# Patient Record
Sex: MALE | Race: WHITE | Employment: FULL TIME | URBAN - METROPOLITAN AREA
[De-identification: names, ages, dates, MRNs, and addresses within clinical notes are randomized per-mention and may not be internally consistent; named-entity substitution may affect disease eponyms.]

---

## 2017-07-26 ENCOUNTER — OFFICE VISIT (OUTPATIENT)
Dept: INTERNAL MEDICINE CLINIC | Age: 37
End: 2017-07-26

## 2017-07-26 VITALS
OXYGEN SATURATION: 98 % | WEIGHT: 196 LBS | BODY MASS INDEX: 28.06 KG/M2 | HEIGHT: 70 IN | SYSTOLIC BLOOD PRESSURE: 130 MMHG | TEMPERATURE: 98.2 F | RESPIRATION RATE: 18 BRPM | DIASTOLIC BLOOD PRESSURE: 70 MMHG | HEART RATE: 77 BPM

## 2017-07-26 DIAGNOSIS — Z80.0 FHX: COLON CANCER: ICD-10-CM

## 2017-07-26 DIAGNOSIS — Z00.00 WELL ADULT EXAM: Primary | ICD-10-CM

## 2017-07-26 DIAGNOSIS — G47.9 SLEEP DISTURBANCE: ICD-10-CM

## 2017-07-26 NOTE — PROGRESS NOTES
Chief Complaint   Patient presents with   [facility] Establish Care     1. Have you been to the ER, urgent care clinic since your last visit? No Hospitalized since your last visit? No    2. Have you seen or consulted any other health care providers outside of the [address] [place] [address] since your last visit? Yes, Chiropractor  Include any pap smears or colon screening.  No

## 2017-07-26 NOTE — PATIENT INSTRUCTIONS
Follow a Mediterranean style diet. Stay physically active. Have blood work assessment done. Have colonoscopy done at age 46. Learning About the [address] Diet  What is the Mediterranean diet? The Mediterranean diet is a style of eating rather than a diet plan. It features foods eaten in [place], [place], [place], and other countries along the [facility]. It emphasizes eating foods like fish, fruits, vegetables, beans, high-fiber breads and whole grains, nuts, and olive oil. This style of eating includes limited red meat, cheese, and sweets. Why choose the Mediterranean diet? A Mediterranean-style diet may improve heart health. It contains more fat than other heart-healthy diets. But the fats are mainly from nuts, unsaturated oils (such as fish oils and olive oil), and certain nut or seed oils (such as canola, soybean, or flaxseed oil). These fats may help protect the heart and blood vessels. How can you get started on the Mediterranean diet? Here are some things you can do to switch to a more Mediterranean way of eating. What to eat  · Eat a variety of fruits and vegetables each day, such as grapes, blueberries, tomatoes, broccoli, peppers, figs, olives, spinach, eggplant, beans, lentils, and chickpeas. · Eat a variety of whole-grain foods each day, such as oats, brown rice, and whole wheat bread, pasta, and couscous. · Eat fish at least 2 times a week. Try tuna, salmon, mackerel, lake trout, herring, or sardines. · Eat moderate amounts of low-fat dairy products, such as milk, cheese, or yogurt. · Eat moderate amounts of poultry and eggs. · Choose healthy (unsaturated) fats, such as nuts, olive oil, and certain nut or seed oils like canola, soybean, and flaxseed. · Limit unhealthy (saturated) fats, such as butter, palm oil, and coconut oil. And limit fats found in animal products, such as meat and dairy products made with whole milk.  Try to eat red meat only a few times a month in very small amounts. · Limit sweets and desserts to only a few times a week. This includes sugar-sweetened drinks like soda. The Mediterranean diet may also include red wine with your meal--1 glass each day for women and up to 2 glasses a day for men. Tips for eating at home  · Use herbs, spices, garlic, lemon zest, and citrus juice instead of salt to add flavor to foods. · Add avocado slices to your sandwich instead of avelar. · Have fish for lunch or dinner instead of red meat. Brush the fish with olive oil, and broil or grill it. · Sprinkle your salad with seeds or nuts instead of cheese. · Cook with olive or canola oil instead of butter or oils that are high in saturated fat. · Switch from 2% milk or whole milk to 1% or fat-free milk. · Dip raw vegetables in a vinaigrette dressing or hummus instead of dips made from mayonnaise or sour cream.  · Have a piece of fruit for dessert instead of a piece of cake. Try baked apples, or have some dried fruit. Tips for eating out  · Try broiled, grilled, baked, or poached fish instead of having it fried or breaded. · Ask your  to have your meals prepared with olive oil instead of butter. · Order dishes made with marinara sauce or sauces made from olive oil. Avoid sauces made from cream or mayonnaise. · Choose whole-grain breads, whole wheat pasta and pizza crust, brown rice, beans, and lentils. · Cut back on butter or margarine on bread. Instead, you can dip your bread in a small amount of olive oil. · Ask for a side salad or grilled vegetables instead of french fries or chips. Where can you learn more? Go to http://maryanne-milo.info/. Enter 296-386-2575 in the search box to learn more about \"Learning About the Mediterranean Diet. \"  Current as of: December 29, 2016  Content Version: 11.3  © 4376-1969 [employer].  Care instructions adapted under license by [employer] (which disclaims liability or warranty for this information). If you have questions about a medical condition or this instruction, always ask your healthcare professional. [place] [address] any warranty or liability for your use of this information.

## 2017-07-26 NOTE — PROGRESS NOTES
Subjective:     Chief Complaint   Patient presents with   [address]     He  is a 42y.o. year old male who presents for evaluation. For the most part is following a healthy diet and exercising. Sleep is good on Doxepin on occasion. Medical reconciliation states he is on Adderall but  says not prescribed for the past year and he makes no mention of it. Historical Data    Past Medical History:   Diagnosis Date    ADD (attention deficit disorder)     Chronic allergic rhinitis     Dr. Nancy Cazares FHx: colon cancer     maternal aunt dx'd at age 39    FHx: melanoma     father, PGF    Hypertriglyceridemia 12/16/14    Overweight     Reactive airway disease         No past surgical history on file. Outpatient Encounter Prescriptions as of 7/26/2017   Medication Sig Dispense Refill    dextroamphetamine-amphetamine (ADDERALL) 10 mg tablet Take 1 Tab by mouth two (2) times daily as needed. Max Daily Amount: 20 mg. 60 Tab 0    Doxepin (SILENOR) 6 mg tab Take 1 Tab by mouth nightly as needed (sleep). 30 Tab 0    chlorphen-pseudoeph-ibuprofen 2- mg tab Take  by mouth two (2) times a day.  aspirin delayed-release 81 mg tablet Take  by mouth daily. No facility-administered encounter medications on file as of 7/26/2017. No Known Allergies     Social History     Social History    Marital status:      Spouse name: N/A    Number of children: N/A    Years of education: N/A     Occupational History    Not on file. Social History Main Topics    Smoking status: Never Smoker    Smokeless tobacco: Never Used    Alcohol use Yes      Comment: ocassional     Drug use: No    Sexual activity: Yes     Partners: Female     Other Topics Concern    Not on file     Social History Narrative      Review of Systems  Pertinent items are noted in HPI.     Objective:     Vitals:    07/26/17 0924   BP: 130/70   Pulse: 77   Resp: 18   Temp: 98.2 °F (36.8 °C)   TempSrc: Oral   SpO2: 98% Weight: 196 lb (88.9 kg)   Height: 6' (1.829 m)     Pleasant WM in no acute distress. Neck: Supple. Cardiac: RRR without murmurs, gallops or rubs. Lungs: Clear to auscultation. Neuro: Intact. ASSESSMENT / PLAN:   1. Well adult exam  · Mediterranean diet. · Regular exercise  - LIPID PANEL  - TSH REFLEX TO T4  - METABOLIC PANEL, COMPREHENSIVE    2. Sleep disturbance  · Continue prn Doxepin. 3. FHx: colon cancer  · Start colon cancer screening at age 46. Patient Instructions   Follow a Mediterranean style diet. Stay physically active. Have blood work assessment done. Have colonoscopy done at age 46. Learning About the [address] [facility] Diet  What is the Mediterranean diet? The Mediterranean diet is a style of eating rather than a diet plan. It features foods eaten in [place], [place], [place] and [place], and other countries along the [facility]. It emphasizes eating foods like fish, fruits, vegetables, beans, high-fiber breads and whole grains, nuts, and olive oil. This style of eating includes limited red meat, cheese, and sweets. Why choose the Mediterranean diet? A Mediterranean-style diet may improve heart health. It contains more fat than other heart-healthy diets. But the fats are mainly from nuts, unsaturated oils (such as fish oils and olive oil), and certain nut or seed oils (such as canola, soybean, or flaxseed oil). These fats may help protect the heart and blood vessels. How can you get started on the Mediterranean diet? Here are some things you can do to switch to a more Mediterranean way of eating. What to eat  · Eat a variety of fruits and vegetables each day, such as grapes, blueberries, tomatoes, broccoli, peppers, figs, olives, spinach, eggplant, beans, lentils, and chickpeas. · Eat a variety of whole-grain foods each day, such as oats, brown rice, and whole wheat bread, pasta, and couscous. · Eat fish at least 2 times a week.  Try tuna, salmon, mackerel, lake trout, herring, or sardines. · Eat moderate amounts of low-fat dairy products, such as milk, cheese, or yogurt. · Eat moderate amounts of poultry and eggs. · Choose healthy (unsaturated) fats, such as nuts, olive oil, and certain nut or seed oils like canola, soybean, and flaxseed. · Limit unhealthy (saturated) fats, such as butter, palm oil, and coconut oil. And limit fats found in animal products, such as meat and dairy products made with whole milk. Try to eat red meat only a few times a month in very small amounts. · Limit sweets and desserts to only a few times a week. This includes sugar-sweetened drinks like soda. The Mediterranean diet may also include red wine with your meal--1 glass each day for women and up to 2 glasses a day for men. Tips for eating at home  · Use herbs, spices, garlic, lemon zest, and citrus juice instead of salt to add flavor to foods. · Add avocado slices to your sandwich instead of avelar. · Have fish for lunch or dinner instead of red meat. Brush the fish with olive oil, and broil or grill it. · Sprinkle your salad with seeds or nuts instead of cheese. · Cook with olive or canola oil instead of butter or oils that are high in saturated fat. · Switch from 2% milk or whole milk to 1% or fat-free milk. · Dip raw vegetables in a vinaigrette dressing or hummus instead of dips made from mayonnaise or sour cream.  · Have a piece of fruit for dessert instead of a piece of cake. Try baked apples, or have some dried fruit. Tips for eating out  · Try broiled, grilled, baked, or poached fish instead of having it fried or breaded. · Ask your  to have your meals prepared with olive oil instead of butter. · Order dishes made with marinara sauce or sauces made from olive oil. Avoid sauces made from cream or mayonnaise. · Choose whole-grain breads, whole wheat pasta and pizza crust, brown rice, beans, and lentils. · Cut back on butter or margarine on bread.  Instead, you can dip your bread in a small amount of olive oil. · Ask for a side salad or grilled vegetables instead of french fries or chips. Where can you learn more? Go to http://maryanne-milo.info/. Enter 160-910-6492 in the search box to learn more about \"Learning About the Mediterranean Diet. \"  Current as of: December 29, 2016  Content Version: 11.3  © 4347-3542 [employer]. Care instructions adapted under license by [employer] (which disclaims liability or warranty for this information). If you have questions about a medical condition or this instruction, always ask your healthcare professional. [place] [address] any warranty or liability for your use of this information. Follow-up Disposition:  Return in about 1 year (around 7/26/2018) for Regular follow up. Advised him to call back or return to office if symptoms worsen/change/persist.  Discussed expected course/resolution/complications of diagnosis in detail with patient. Medication risks/benefits/costs/interactions/alternatives discussed with patient. He was given an after visit summary which includes diagnoses, current medications, & vitals. He expressed understanding with the diagnosis and plan.

## 2017-07-26 NOTE — MR AVS SNAPSHOT
Visit Information Date & Time Provider Department Dept. Phone Encounter #  
 7/26/2017  9:15 AM Naveen Faustin MD [place] [address] [facility] [phone] [id number] Follow-up Instructions Return in about 1 year (around 7/26/2018) for Regular follow up. Upcoming Health Maintenance Date Due DTaP/Tdap/Td series (1 - Tdap) 1/2/2012 INFLUENZA AGE 9 TO ADULT 8/1/2017 Allergies as of 7/26/2017  Review Complete On: 7/26/2017 By: Naveen Faustin MD  
 No Known Allergies Current Immunizations  Reviewed on 2/10/2016 Name Date Td 1/1/2012 Not reviewed this visit You Were Diagnosed With   
  
 Codes Comments Well adult exam    -  Primary ICD-10-CM: Z00.00 ICD-9-CM: V70.0 Vitals BP Pulse Temp Resp Height(growth percentile) Weight(growth percentile) 130/70 (BP 1 Location: Left arm, BP Patient Position: Sitting) 77 98.2 °F (36.8 °C) (Oral) 18 6' (1.829 m) 196 lb (88.9 kg) SpO2 BMI Smoking Status 98% 26.58 kg/m2 Never Smoker Vitals History BMI and BSA Data Body Mass Index Body Surface Area  
 26.58 kg/m 2 2.13 m 2 Preferred Pharmacy Pharmacy Name Phone [facility] [address] [address] [phone] Your Updated Medication List  
  
   
This list is accurate as of: 7/26/17  9:50 AM.  Always use your most recent med list.  
  
  
  
  
 aspirin delayed-release 81 mg tablet Take  by mouth daily. chlorphen-pseudoeph-ibuprofen 2- mg Tab Take  by mouth two (2) times a day. dextroamphetamine-amphetamine 10 mg tablet Commonly known as:  ADDERALL Take 1 Tab by mouth two (2) times daily as needed. Max Daily Amount: 20 mg. Doxepin 6 mg Tab Commonly known as:  Ranjith Schwab Take 1 Tab by mouth nightly as needed (sleep). We Performed the Following LIPID PANEL [17328 CPT(R)] METABOLIC PANEL, COMPREHENSIVE [35049 CPT(R)] TSH REFLEX TO T4 [[id number] Custom] Follow-up Instructions Return in about 1 year (around 7/26/2018) for Regular follow up. Patient Instructions Follow a Mediterranean style diet. Stay physically active. Have blood work assessment done. Have colonoscopy done at age 46. Learning About the [address] Diet What is the Mediterranean diet? The Mediterranean diet is a style of eating rather than a diet plan. It features foods eaten in [place], [place], [place] and [place], and other countries along the [facility]. It emphasizes eating foods like fish, fruits, vegetables, beans, high-fiber breads and whole grains, nuts, and olive oil. This style of eating includes limited red meat, cheese, and sweets. Why choose the Mediterranean diet? A Mediterranean-style diet may improve heart health. It contains more fat than other heart-healthy diets. But the fats are mainly from nuts, unsaturated oils (such as fish oils and olive oil), and certain nut or seed oils (such as canola, soybean, or flaxseed oil). These fats may help protect the heart and blood vessels. How can you get started on the Mediterranean diet? Here are some things you can do to switch to a more Mediterranean way of eating. What to eat · Eat a variety of fruits and vegetables each day, such as grapes, blueberries, tomatoes, broccoli, peppers, figs, olives, spinach, eggplant, beans, lentils, and chickpeas. · Eat a variety of whole-grain foods each day, such as oats, brown rice, and whole wheat bread, pasta, and couscous. · Eat fish at least 2 times a week. Try tuna, salmon, mackerel, lake trout, herring, or sardines. · Eat moderate amounts of low-fat dairy products, such as milk, cheese, or yogurt. · Eat moderate amounts of poultry and eggs. · Choose healthy (unsaturated) fats, such as nuts, olive oil, and certain nut or seed oils like canola, soybean, and flaxseed. · Limit unhealthy (saturated) fats, such as butter, palm oil, and coconut oil. And limit fats found in animal products, such as meat and dairy products made with whole milk. Try to eat red meat only a few times a month in very small amounts. · Limit sweets and desserts to only a few times a week. This includes sugar-sweetened drinks like soda. The Mediterranean diet may also include red wine with your meal1 glass each day for women and up to 2 glasses a day for men. Tips for eating at home · Use herbs, spices, garlic, lemon zest, and citrus juice instead of salt to add flavor to foods. · Add avocado slices to your sandwich instead of avelar. · Have fish for lunch or dinner instead of red meat. Brush the fish with olive oil, and broil or grill it. · Sprinkle your salad with seeds or nuts instead of cheese. · Cook with olive or canola oil instead of butter or oils that are high in saturated fat. · Switch from 2% milk or whole milk to 1% or fat-free milk. · Dip raw vegetables in a vinaigrette dressing or hummus instead of dips made from mayonnaise or sour cream. 
· Have a piece of fruit for dessert instead of a piece of cake. Try baked apples, or have some dried fruit. Tips for eating out · Try broiled, grilled, baked, or poached fish instead of having it fried or breaded. · Ask your  to have your meals prepared with olive oil instead of butter. · Order dishes made with marinara sauce or sauces made from olive oil. Avoid sauces made from cream or mayonnaise. · Choose whole-grain breads, whole wheat pasta and pizza crust, brown rice, beans, and lentils. · Cut back on butter or margarine on bread. Instead, you can dip your bread in a small amount of olive oil. · Ask for a side salad or grilled vegetables instead of french fries or chips. Where can you learn more? Go to http://katy.info/.  
Enter O407 in the search box to learn more about \"Learning About the Mediterranean Diet. \" Current as of: December 29, 2016 Content Version: 11.3 © 2118-9840 [employer]. Care instructions adapted under license by [employer] (which disclaims liability or warranty for this information). If you have questions about a medical condition or this instruction, always ask your healthcare professional. [address]  any warranty or liability for your use of this information. Introducing [facility]! [facility] introduces Parallel Universe patient portal. Now you can access parts of your medical record, email your doctor's office, and request medication refills online. 1. In your internet browser, go to [email or web address] 2. Click on the First Time User? Click Here link in the Sign In box. You will see the New Member Sign Up page. 3. Enter your Parallel Universe Access Code exactly as it appears below. You will not need to use this code after youve completed the sign-up process. If you do not sign up before the expiration date, you must request a new code. · Parallel Universe Access Code: [email or web address] Expires: 10/24/2017  9:50 AM 
 
4. Enter the last four digits of your Social Security Number (xxxx) and Date of Birth (mm/dd/yyyy) as indicated and click Submit. You will be taken to the next sign-up page. 5. Create a Parallel Universe ID. This will be your Parallel Universe login ID and cannot be changed, so think of one that is secure and easy to remember. 6. Create a Parallel Universe password. You can change your password at any time. 7. Enter your Password Reset Question and Answer. This can be used at a later time if you forget your password. 8. Enter your e-mail address. You will receive e-mail notification when new information is available in [address] 9. Click Sign Up. You can now view and download portions of your medical record. 10. Click the Download Summary menu link to download a portable copy of your medical information. If you have questions, please visit the Frequently Asked Questions section of the [employer] website. Remember, [email or web address] is NOT to be used for urgent needs. For medical emergencies, dial 911. Now available from your iPhone and Android! Please provide this summary of care documentation to your next provider. Your primary care clinician is listed as Nick Garland. If you have any questions after today's visit, please call [phone].

## 2017-07-27 LAB
ALBUMIN SERPL-MCNC: 4.8 G/DL (ref 3.5–5.5)
ALBUMIN/GLOB SERPL: 2 {RATIO} (ref 1.2–2.2)
ALP SERPL-CCNC: 42 IU/L (ref 39–117)
ALT SERPL-CCNC: 20 IU/L (ref 0–44)
AST SERPL-CCNC: 19 IU/L (ref 0–40)
BILIRUB SERPL-MCNC: 0.3 MG/DL (ref 0–1.2)
BUN SERPL-MCNC: 15 MG/DL (ref 6–20)
BUN/CREAT SERPL: 18 (ref 9–20)
CALCIUM SERPL-MCNC: 9.4 MG/DL (ref 8.7–10.2)
CHLORIDE SERPL-SCNC: 100 MMOL/L (ref 96–106)
CHOLEST SERPL-MCNC: 218 MG/DL (ref 100–199)
CO2 SERPL-SCNC: 24 MMOL/L (ref 18–29)
CREAT SERPL-MCNC: 0.83 MG/DL (ref 0.76–1.27)
GLOBULIN SER CALC-MCNC: 2.4 G/DL (ref 1.5–4.5)
GLUCOSE SERPL-MCNC: 93 MG/DL (ref 65–99)
HDLC SERPL-MCNC: 39 MG/DL
INTERPRETATION, 910389: NORMAL
LDLC SERPL CALC-MCNC: 140 MG/DL (ref 0–99)
POTASSIUM SERPL-SCNC: 4.5 MMOL/L (ref 3.5–5.2)
PROT SERPL-MCNC: 7.2 G/DL (ref 6–8.5)
SODIUM SERPL-SCNC: 141 MMOL/L (ref 134–144)
TRIGL SERPL-MCNC: 195 MG/DL (ref 0–149)
TSH SERPL DL<=0.005 MIU/L-ACNC: 3.82 UIU/ML (ref 0.45–4.5)
VLDLC SERPL CALC-MCNC: 39 MG/DL (ref 5–40)

## 2017-07-28 DIAGNOSIS — G47.9 SLEEP DISTURBANCE: ICD-10-CM

## 2017-07-28 NOTE — TELEPHONE ENCOUNTER
Patients wife called upset stating that her  requested medication refills and did not get them. I apologized for lack in communication and advised her that I would request mediation refills which we would get a response on Monday. She insisted that it be done as soon as possible.

## 2017-07-31 RX ORDER — DOXEPIN HYDROCHLORIDE 6 MG/1
1 TABLET ORAL
Qty: 30 TAB | Refills: 0 | Status: SHIPPED | OUTPATIENT
Start: 2017-07-31 | End: 2017-09-18 | Stop reason: SDUPTHER

## 2017-07-31 RX ORDER — DEXTROAMPHETAMINE SACCHARATE, AMPHETAMINE ASPARTATE, DEXTROAMPHETAMINE SULFATE AND AMPHETAMINE SULFATE 2.5; 2.5; 2.5; 2.5 MG/1; MG/1; MG/1; MG/1
10 TABLET ORAL
Qty: 60 TAB | Refills: 0 | Status: SHIPPED | OUTPATIENT
Start: 2017-07-31 | End: 2017-09-18 | Stop reason: SDUPTHER

## 2017-07-31 NOTE — TELEPHONE ENCOUNTER
Called patient to advise that RX requested is ready for      Left voicemail message with the above mentioned information

## 2017-09-18 DIAGNOSIS — G47.9 SLEEP DISTURBANCE: ICD-10-CM

## 2017-09-18 NOTE — TELEPHONE ENCOUNTER
Requested Prescriptions     Pending Prescriptions Disp Refills    dextroamphetamine-amphetamine (ADDERALL) 10 mg tablet 60 Tab 0     Sig: Take 1 Tab (10 mg total) by mouth two (2) times daily as neededEarliest Fill Date: 9/18/17. Max Daily Amount: 20 mg    Doxepin (SILENOR) 6 mg tab 30 Tab 0     Sig: Take 1 Tab by mouth nightly as needed (sleep). Patient's wife wanted to know if the silenor could be given for more than one month.   Last 07/26/2017  Next 07/31/2018

## 2017-09-19 RX ORDER — DEXTROAMPHETAMINE SACCHARATE, AMPHETAMINE ASPARTATE, DEXTROAMPHETAMINE SULFATE AND AMPHETAMINE SULFATE 2.5; 2.5; 2.5; 2.5 MG/1; MG/1; MG/1; MG/1
10 TABLET ORAL
Qty: 60 TAB | Refills: 0 | Status: SHIPPED | OUTPATIENT
Start: 2017-09-19 | End: 2017-11-08 | Stop reason: SDUPTHER

## 2017-09-19 RX ORDER — DOXEPIN HYDROCHLORIDE 6 MG/1
1 TABLET ORAL
Qty: 30 TAB | Refills: 2 | Status: SHIPPED | OUTPATIENT
Start: 2017-09-19 | End: 2017-09-20 | Stop reason: SDUPTHER

## 2017-09-20 DIAGNOSIS — G47.9 SLEEP DISTURBANCE: ICD-10-CM

## 2017-09-20 RX ORDER — DOXEPIN HYDROCHLORIDE 6 MG/1
TABLET ORAL
Qty: 30 TAB | Refills: 0 | Status: SHIPPED | OUTPATIENT
Start: 2017-09-20 | End: 2017-10-20 | Stop reason: SDUPTHER

## 2017-11-08 NOTE — TELEPHONE ENCOUNTER
Requested Prescriptions     Pending Prescriptions Disp Refills    dextroamphetamine-amphetamine (ADDERALL) 10 mg tablet 60 Tab 0     Sig: Take 1 Tab (10 mg total) by mouth two (2) times daily as needed.   Max Daily Amount: 20 mg   pt  Last office visit [id number] next appt [phone]  Pt contact # [phone]

## 2017-11-09 RX ORDER — DEXTROAMPHETAMINE SACCHARATE, AMPHETAMINE ASPARTATE, DEXTROAMPHETAMINE SULFATE AND AMPHETAMINE SULFATE 2.5; 2.5; 2.5; 2.5 MG/1; MG/1; MG/1; MG/1
10 TABLET ORAL
Qty: 60 TAB | Refills: 0 | Status: SHIPPED | OUTPATIENT
Start: 2017-11-09 | End: 2017-12-21 | Stop reason: SDUPTHER

## 2017-12-20 NOTE — TELEPHONE ENCOUNTER
Requested Prescriptions     Pending Prescriptions Disp Refills    dextroamphetamine-amphetamine (ADDERALL) 10 mg tablet 60 Tab 0     Sig: Take 1 Tab (10 mg total) by mouth two (2) times daily as needed.   Max Daily Amount: 20 mg     07/26/2017 07/31/2018

## 2017-12-21 DIAGNOSIS — F98.8 ATTENTION DEFICIT DISORDER (ADD) WITHOUT HYPERACTIVITY: Primary | ICD-10-CM

## 2017-12-21 RX ORDER — DEXTROAMPHETAMINE SACCHARATE, AMPHETAMINE ASPARTATE, DEXTROAMPHETAMINE SULFATE AND AMPHETAMINE SULFATE 2.5; 2.5; 2.5; 2.5 MG/1; MG/1; MG/1; MG/1
10 TABLET ORAL
Qty: 60 TAB | Refills: 0 | Status: SHIPPED | OUTPATIENT
Start: 2017-12-21 | End: 2018-01-18 | Stop reason: SDUPTHER

## 2017-12-21 RX ORDER — DEXTROAMPHETAMINE SACCHARATE, AMPHETAMINE ASPARTATE, DEXTROAMPHETAMINE SULFATE AND AMPHETAMINE SULFATE 2.5; 2.5; 2.5; 2.5 MG/1; MG/1; MG/1; MG/1
10 TABLET ORAL
Qty: 60 TAB | Refills: 0 | OUTPATIENT
Start: 2017-12-21

## 2018-01-18 DIAGNOSIS — F98.8 ATTENTION DEFICIT DISORDER (ADD) WITHOUT HYPERACTIVITY: ICD-10-CM

## 2018-01-18 RX ORDER — DEXTROAMPHETAMINE SACCHARATE, AMPHETAMINE ASPARTATE, DEXTROAMPHETAMINE SULFATE AND AMPHETAMINE SULFATE 2.5; 2.5; 2.5; 2.5 MG/1; MG/1; MG/1; MG/1
10 TABLET ORAL
Qty: 60 TAB | Refills: 0 | Status: SHIPPED | OUTPATIENT
Start: 2018-01-18 | End: 2018-03-01 | Stop reason: SDUPTHER

## 2018-01-18 NOTE — TELEPHONE ENCOUNTER
Requested Prescriptions     Pending Prescriptions Disp Refills    dextroamphetamine-amphetamine (ADDERALL) 10 mg tablet 60 Tab 0     Sig: Take 1 Tab (10 mg total) by mouth two (2) times daily as neededIndications: Attention-Deficit Hyperactivity Disorder.   Max Daily Amount: 20 mg     Last OV:07/26/17  Next OV:07/31/18

## 2018-03-01 DIAGNOSIS — F98.8 ATTENTION DEFICIT DISORDER (ADD) WITHOUT HYPERACTIVITY: ICD-10-CM

## 2018-03-01 RX ORDER — DEXTROAMPHETAMINE SACCHARATE, AMPHETAMINE ASPARTATE, DEXTROAMPHETAMINE SULFATE AND AMPHETAMINE SULFATE 2.5; 2.5; 2.5; 2.5 MG/1; MG/1; MG/1; MG/1
10 TABLET ORAL
Qty: 60 TAB | Refills: 0 | Status: SHIPPED | OUTPATIENT
Start: 2018-03-01 | End: 2018-04-05 | Stop reason: SDUPTHER

## 2018-04-05 DIAGNOSIS — F98.8 ATTENTION DEFICIT DISORDER (ADD) WITHOUT HYPERACTIVITY: ICD-10-CM

## 2018-04-05 RX ORDER — DEXTROAMPHETAMINE SACCHARATE, AMPHETAMINE ASPARTATE, DEXTROAMPHETAMINE SULFATE AND AMPHETAMINE SULFATE 2.5; 2.5; 2.5; 2.5 MG/1; MG/1; MG/1; MG/1
10 TABLET ORAL
Qty: 60 TAB | Refills: 0 | Status: SHIPPED | OUTPATIENT
Start: 2018-04-05 | End: 2018-05-14 | Stop reason: SDUPTHER

## 2018-05-14 DIAGNOSIS — F98.8 ATTENTION DEFICIT DISORDER (ADD) WITHOUT HYPERACTIVITY: ICD-10-CM

## 2018-05-14 RX ORDER — DEXTROAMPHETAMINE SACCHARATE, AMPHETAMINE ASPARTATE, DEXTROAMPHETAMINE SULFATE AND AMPHETAMINE SULFATE 2.5; 2.5; 2.5; 2.5 MG/1; MG/1; MG/1; MG/1
10 TABLET ORAL
Qty: 60 TAB | Refills: 0 | Status: SHIPPED | OUTPATIENT
Start: 2018-05-14 | End: 2018-07-05 | Stop reason: SDUPTHER

## 2018-05-14 NOTE — TELEPHONE ENCOUNTER
Requested Prescriptions     Pending Prescriptions Disp Refills    dextroamphetamine-amphetamine (ADDERALL) 10 mg tablet 60 Tab 0     Sig: Take 1 Tab (10 mg total) by mouth two (2) times daily as neededIndications: Attention-Deficit Hyperactivity Disorder.   Max Daily Amount: 20 mg     07/26/2017 07/31/2018  Please call when ready

## 2018-07-05 DIAGNOSIS — F98.8 ATTENTION DEFICIT DISORDER (ADD) WITHOUT HYPERACTIVITY: ICD-10-CM

## 2018-07-05 RX ORDER — DEXTROAMPHETAMINE SACCHARATE, AMPHETAMINE ASPARTATE, DEXTROAMPHETAMINE SULFATE AND AMPHETAMINE SULFATE 2.5; 2.5; 2.5; 2.5 MG/1; MG/1; MG/1; MG/1
10 TABLET ORAL
Qty: 60 TAB | Refills: 0 | Status: SHIPPED | OUTPATIENT
Start: 2018-07-05 | End: 2018-07-31 | Stop reason: SDUPTHER

## 2018-07-05 NOTE — TELEPHONE ENCOUNTER
Requested Prescriptions     Pending Prescriptions Disp Refills    dextroamphetamine-amphetamine (ADDERALL) 10 mg tablet 60 Tab 0     Sig: Take 1 Tab (10 mg total) by mouth two (2) times daily as neededIndications: Attention-Deficit Hyperactivity Disorder.   Max Daily Amount: 20 mg     07/26/17 07/31/18

## 2018-07-31 ENCOUNTER — OFFICE VISIT (OUTPATIENT)
Dept: INTERNAL MEDICINE CLINIC | Age: 38
End: 2018-07-31

## 2018-07-31 VITALS
OXYGEN SATURATION: 98 % | BODY MASS INDEX: 29.13 KG/M2 | HEART RATE: 75 BPM | RESPIRATION RATE: 16 BRPM | TEMPERATURE: 97.6 F | DIASTOLIC BLOOD PRESSURE: 72 MMHG | HEIGHT: 69 IN | SYSTOLIC BLOOD PRESSURE: 108 MMHG | WEIGHT: 196.7 LBS

## 2018-07-31 DIAGNOSIS — Z80.0 FHX: COLON CANCER: ICD-10-CM

## 2018-07-31 DIAGNOSIS — F98.8 ATTENTION DEFICIT DISORDER (ADD) WITHOUT HYPERACTIVITY: ICD-10-CM

## 2018-07-31 DIAGNOSIS — Z00.00 WELL ADULT EXAM: Primary | ICD-10-CM

## 2018-07-31 RX ORDER — DEXTROAMPHETAMINE SACCHARATE, AMPHETAMINE ASPARTATE, DEXTROAMPHETAMINE SULFATE AND AMPHETAMINE SULFATE 2.5; 2.5; 2.5; 2.5 MG/1; MG/1; MG/1; MG/1
10 TABLET ORAL
Qty: 60 TAB | Refills: 0 | Status: SHIPPED | OUTPATIENT
Start: 2018-07-31 | End: 2018-09-13 | Stop reason: SDUPTHER

## 2018-07-31 NOTE — MR AVS SNAPSHOT
[address] [place] [address], [address] [place] [address] 
[phone] Patient: Newton Segundo MRN: [id number] UJM:[birth year]32 Visit Information Date & Time Provider Department Dept. Phone Encounter #  
 7/31/2018 11:00 AM Hilda Dominguez MD [place] [address] [facility] [phone] [id number] Follow-up Instructions Return in about 1 year (around 7/31/2019) for Physical.  
  
Upcoming Health Maintenance Date Due DTaP/Tdap/Td series (1 - Tdap) 1/2/2012 Influenza Age 5 to Adult 8/1/2018 Allergies as of 7/31/2018  Review Complete On: 7/31/2018 By: Hilda Dominguez MD  
 No Known Allergies Current Immunizations  Reviewed on 2/10/2016 Name Date Td 1/1/2012 Not reviewed this visit You Were Diagnosed With   
  
 Codes Comments Well adult exam    -  Primary ICD-10-CM: Z00.00 ICD-9-CM: V70.0 Attention deficit disorder (ADD) without hyperactivity     ICD-10-CM: F98.8 ICD-9-CM: 314.00 FHx: colon cancer     ICD-10-CM: Z80.0 ICD-9-CM: V16.0 Vitals BP Pulse Temp Resp Height(growth percentile) Weight(growth percentile) 108/72 (BP 1 Location: Left arm, BP Patient Position: Sitting) 75 97.6 °F (36.4 °C) (Oral) 16 5' 9.39\" (1.763 m) 196 lb 11.2 oz (89.2 kg) SpO2 BMI Smoking Status 98% 28.72 kg/m2 Never Smoker Vitals History BMI and BSA Data Body Mass Index Body Surface Area 28.72 kg/m 2 2.09 m 2 Preferred Pharmacy Pharmacy Name Phone [facility] [phone] Your Updated Medication List  
  
   
This list is accurate as of 7/31/18 11:38 AM.  Always use your most recent med list.  
  
  
  
  
 dextroamphetamine-amphetamine 10 mg tablet Commonly known as:  ADDERALL Take 1 Tab (10 mg total) by mouth two (2) times daily as neededIndications: Attention-Deficit Hyperactivity Disorder Earliest Fill Date: 7/31/18. Max Daily Amount: 20 mg  
  
  
  
  
Prescriptions Printed Refills  
 dextroamphetamine-amphetamine (ADDERALL) 10 mg tablet 0 Sig: Take 1 Tab (10 mg total) by mouth two (2) times daily as neededIndications: Attention-Deficit Hyperactivity Disorder Earliest Fill Date: 7/31/18. Max Daily Amount: 20 mg  
 Class: Print Route: Oral  
  
We Performed the Following LIPID PANEL [28920 CPT(R)] METABOLIC PANEL, COMPREHENSIVE [30318 CPT(R)] PSA W/ REFLX FREE PSA [46275 CPT(R)] REFERRAL TO GASTROENTEROLOGY [WWR80 Custom] URINALYSIS W/ RFLX MICROSCOPIC [52170 CPT(R)] Follow-up Instructions Return in about 1 year (around 7/31/2019) for Physical.  
  
  
Referral Information Referral ID Referred By Referred To  
  
 [id number] [facility] [phone] [place], [address] Visits Status Start Date End Date 1 New Request 7/31/18 7/31/19 If your referral has a status of pending review or denied, additional information will be sent to support the outcome of this decision. Patient Instructions Follow a Mediterranean diet. Get regular aerobic activity. Consider taking a super B-complex vitamin every day. Consider taking an 81 mg aspirin daily. Learning About the [address] [facility] [address] Diet What is the Mediterranean diet? The Mediterranean diet is a style of eating rather than a diet plan. It features foods eaten in [place], [place], [place] and [place], and other countries along the [place]. It emphasizes eating foods like fish, fruits, vegetables, beans, high-fiber breads and whole grains, nuts, and olive oil. This style of eating includes limited red meat, cheese, and sweets. Why choose the Mediterranean diet? A Mediterranean-style diet may improve heart health.  It contains more fat than other heart-healthy diets. But the fats are mainly from nuts, unsaturated oils (such as fish oils and olive oil), and certain nut or seed oils (such as canola, soybean, or flaxseed oil). These fats may help protect the heart and blood vessels. How can you get started on the Mediterranean diet? Here are some things you can do to switch to a more Mediterranean way of eating. What to eat · Eat a variety of fruits and vegetables each day, such as grapes, blueberries, tomatoes, broccoli, peppers, figs, olives, spinach, eggplant, beans, lentils, and chickpeas. · Eat a variety of whole-grain foods each day, such as oats, brown rice, and whole wheat bread, pasta, and couscous. · Eat fish at least 2 times a week. Try tuna, salmon, mackerel, lake trout, herring, or sardines. · Eat moderate amounts of low-fat dairy products, such as milk, cheese, or yogurt. · Eat moderate amounts of poultry and eggs. · Choose healthy (unsaturated) fats, such as nuts, olive oil, and certain nut or seed oils like canola, soybean, and flaxseed. · Limit unhealthy (saturated) fats, such as butter, palm oil, and coconut oil. And limit fats found in animal products, such as meat and dairy products made with whole milk. Try to eat red meat only a few times a month in very small amounts. · Limit sweets and desserts to only a few times a week. This includes sugar-sweetened drinks like soda. The Mediterranean diet may also include red wine with your meal-1 glass each day for women and up to 2 glasses a day for men. Tips for eating at home · Use herbs, spices, garlic, lemon zest, and citrus juice instead of salt to add flavor to foods. · Add avocado slices to your sandwich instead of avelar. · Have fish for lunch or dinner instead of red meat. Brush the fish with olive oil, and broil or grill it. · Sprinkle your salad with seeds or nuts instead of cheese.  
· Cook with olive or canola oil instead of butter or oils that are high in saturated fat. · Switch from 2% milk or whole milk to 1% or fat-free milk. · Dip raw vegetables in a vinaigrette dressing or hummus instead of dips made from mayonnaise or sour cream. 
· Have a piece of fruit for dessert instead of a piece of cake. Try baked apples, or have some dried fruit. Tips for eating out · Try broiled, grilled, baked, or poached fish instead of having it fried or breaded. · Ask your  to have your meals prepared with olive oil instead of butter. · Order dishes made with marinara sauce or sauces made from olive oil. Avoid sauces made from cream or mayonnaise. · Choose whole-grain breads, whole wheat pasta and pizza crust, brown rice, beans, and lentils. · Cut back on butter or margarine on bread. Instead, you can dip your bread in a small amount of olive oil. · Ask for a side salad or grilled vegetables instead of french fries or chips. Where can you learn more? Go to http://maryanne-milo.info/. Enter [phone] in the search box to learn more about \"Learning About the Mediterranean Diet. \" Current as of: May 12, 2017 Content Version: 11.7 © 7034-4485 [employer]. Care instructions adapted under license by [employer] (which disclaims liability or warranty for this information). If you have questions about a medical condition or this instruction, always ask your healthcare professional. [place] [address] any warranty or liability for your use of this information. Introducing [facility]! [facility] introduces Siperian patient portal. Now you can access parts of your medical record, email your doctor's office, and request medication refills online. 1. In your internet browser, go to [email or web address] 2. Click on the First Time User? Click Here link in the Sign In box. You will see the New Member Sign Up page. 3. Enter your Siperian Access Code exactly as it appears below.  You will not need to use this code after youve completed the sign-up process. If you do not sign up before the expiration date, you must request a new code. · [employer] Access Code: [email or web address] Expires: 10/29/2018 11:38 AM 
 
4. Enter the last four digits of your Social Security Number (xxxx) and Date of Birth (mm/dd/yyyy) as indicated and click Submit. You will be taken to the next sign-up page. 5. Create a [employer] ID. This will be your [employer] login ID and cannot be changed, so think of one that is secure and easy to remember. 6. Create a [employer] password. You can change your password at any time. 7. Enter your Password Reset Question and Answer. This can be used at a later time if you forget your password. 8. Enter your e-mail address. You will receive e-mail notification when new information is available in [address] 9. Click Sign Up. You can now view and download portions of your medical record. 10. Click the Download Summary menu link to download a portable copy of your medical information. If you have questions, please visit the Frequently Asked Questions section of the [employer] website. Remember, [employer] is NOT to be used for urgent needs. For medical emergencies, dial 911. Now available from your iPhone and Android! Please provide this summary of care documentation to your next provider. Your primary care clinician is listed as Janey Diamond. If you have any questions after today's visit, please call [phone].

## 2018-07-31 NOTE — PROGRESS NOTES
Subjective: Chief Complaint Patient presents with  Complete Physical  
 
He  is a 34y.o. year old male who presents for evaluation. Diet is pretty healthy. No formal exercise. Hurt his back several weeks ago. Seeing chiropractor. Historical Data Past Medical History:  
Diagnosis Date  ADD (attention deficit disorder)  Chronic allergic rhinitis Dr. Mayank Lanza  FHx: colon cancer   
 maternal aunt dx'd at age 39  
 FHx: melanoma   
 father, PGF  Hypertriglyceridemia 12/16/14  Overweight(278.02)  Reactive airway disease No past surgical history on file. Outpatient Encounter Prescriptions as of 7/31/2018 Medication Sig Dispense Refill  dextroamphetamine-amphetamine (ADDERALL) 10 mg tablet Take 1 Tab (10 mg total) by mouth two (2) times daily as neededIndications: Attention-Deficit Hyperactivity Disorder Earliest Fill Date: 7/5/18. Max Daily Amount: 20 mg 60 Tab 0  [DISCONTINUED] SILENOR 6 mg tab TAKE 1 TABLET BY MOUTH NIGHTLY AS NEEDED FOR SLEEP 30 Tab 3  [DISCONTINUED] aspirin delayed-release 81 mg tablet Take  by mouth daily. No facility-administered encounter medications on file as of 7/31/2018. No Known Allergies Social History Social History  Marital status:  Spouse name: N/A  
 Number of children: N/A  
 Years of education: N/A Occupational History  Not on file. Social History Main Topics  Smoking status: Never Smoker  Smokeless tobacco: Never Used  Alcohol use Yes Comment: ocassional   
 Drug use: No  
 Sexual activity: Yes  
  Partners: Female Other Topics Concern  Not on file Social History Narrative Review of Systems A comprehensive review of systems was negative except for that written in the HPI. Objective:  
 
Vitals:  
 07/31/18 1108 BP: 108/72 Pulse: 75 Resp: 16 Temp: 97.6 °F (36.4 °C) TempSrc: Oral  
SpO2: 98% Weight: 196 lb 11.2 oz (89.2 kg) Height: 5' 9.39\" (1.763 m) Pleasant WM in no acute distress. Skin:  No macules, papules, striae or bites. HEENT: 
 Head:  Normocephalic, atraumatic Ears:  Canals clear. TM's intact Eyes:  EOMI, PERRLA. Throat:  No erythema or exudates Neck:  No masses. No thyromegaly. No adenopathy. Cardiac:  Regular rate and rhythm without murmurs, gallops or rubs. Lungs:  Clear to auscultation. No wheezes, rhonchi or rubs. Abdomen:  Soft and non tender. No HSM. Extremities:  Pulses 2+ and intact. Musculoskeletal: No joint effusions. Neurologic: 
 CN's II-XII:  Intact. Sensation:  Intact to vibratory sense. Motor:  Symmetric and full. Reflexes:  Symmetric and full. Genitalia: Normal male. No hernias. ASSESSMENT / PLAN:  
1. Well adult exam 
· Mediterranean diet. · Regular exercise. - PSA W/ REFLX FREE PSA 
- LIPID PANEL 
- METABOLIC PANEL, COMPREHENSIVE 
- URINALYSIS W/ RFLX MICROSCOPIC 2. Attention deficit disorder (ADD) without hyperactivity 
 
- dextroamphetamine-amphetamine (ADDERALL) 10 mg tablet; Take 1 Tab (10 mg total) by mouth two (2) times daily as neededIndications: Attention-Deficit Hyperactivity Disorder Earliest Fill Date: 7/31/18. Max Daily Amount: 20 mg  Dispense: 60 Tab; Refill: 0 
 
3. FHx: colon cancer · Baseline colonoscopy screen recommended. - REFERRAL TO GASTROENTEROLOGY Patient Instructions Follow a Mediterranean diet. Get regular aerobic activity. Consider taking a super B-complex vitamin every day. Consider taking an 81 mg aspirin daily. Learning About the [address] Diet What is the Mediterranean diet? The Mediterranean diet is a style of eating rather than a diet plan. It features foods eaten in [place], [place], [place] and [place], and other countries along the [place]. It emphasizes eating foods like fish, fruits, vegetables, beans, high-fiber breads and whole grains, nuts, and olive oil.  This style of eating includes limited red meat, cheese, and sweets. Why choose the Mediterranean diet? A Mediterranean-style diet may improve heart health. It contains more fat than other heart-healthy diets. But the fats are mainly from nuts, unsaturated oils (such as fish oils and olive oil), and certain nut or seed oils (such as canola, soybean, or flaxseed oil). These fats may help protect the heart and blood vessels. How can you get started on the Mediterranean diet? Here are some things you can do to switch to a more Mediterranean way of eating. What to eat · Eat a variety of fruits and vegetables each day, such as grapes, blueberries, tomatoes, broccoli, peppers, figs, olives, spinach, eggplant, beans, lentils, and chickpeas. · Eat a variety of whole-grain foods each day, such as oats, brown rice, and whole wheat bread, pasta, and couscous. · Eat fish at least 2 times a week. Try tuna, salmon, mackerel, lake trout, herring, or sardines. · Eat moderate amounts of low-fat dairy products, such as milk, cheese, or yogurt. · Eat moderate amounts of poultry and eggs. · Choose healthy (unsaturated) fats, such as nuts, olive oil, and certain nut or seed oils like canola, soybean, and flaxseed. · Limit unhealthy (saturated) fats, such as butter, palm oil, and coconut oil. And limit fats found in animal products, such as meat and dairy products made with whole milk. Try to eat red meat only a few times a month in very small amounts. · Limit sweets and desserts to only a few times a week. This includes sugar-sweetened drinks like soda. The Mediterranean diet may also include red wine with your meal-1 glass each day for women and up to 2 glasses a day for men. Tips for eating at home · Use herbs, spices, garlic, lemon zest, and citrus juice instead of salt to add flavor to foods. · Add avocado slices to your sandwich instead of avelar. · Have fish for lunch or dinner instead of red meat.  Brush the fish with olive oil, and broil or grill it. · Sprinkle your salad with seeds or nuts instead of cheese. · Cook with olive or canola oil instead of butter or oils that are high in saturated fat. · Switch from 2% milk or whole milk to 1% or fat-free milk. · Dip raw vegetables in a vinaigrette dressing or hummus instead of dips made from mayonnaise or sour cream. 
· Have a piece of fruit for dessert instead of a piece of cake. Try baked apples, or have some dried fruit. Tips for eating out · Try broiled, grilled, baked, or poached fish instead of having it fried or breaded. · Ask your  to have your meals prepared with olive oil instead of butter. · Order dishes made with marinara sauce or sauces made from olive oil. Avoid sauces made from cream or mayonnaise. · Choose whole-grain breads, whole wheat pasta and pizza crust, brown rice, beans, and lentils. · Cut back on butter or margarine on bread. Instead, you can dip your bread in a small amount of olive oil. · Ask for a side salad or grilled vegetables instead of french fries or chips. Where can you learn more? Go to http://maryanne-milo.info/. Enter [phone] in the search box to learn more about \"Learning About the Mediterranean Diet. \" Current as of: May 12, 2017 Content Version: 11.7 © 9484-2966 [employer]. Care instructions adapted under license by "[employer] (which disclaims liability or warranty for this information). If you have questions about a medical condition or this instruction, always ask your healthcare professional. [place] [address] any warranty or liability for your use of this information. Follow-up Disposition: 
Return in about 1 year (around 7/31/2019) for Physical.  
Advised him to call back or return to office if symptoms worsen/change/persist. 
Discussed expected course/resolution/complications of diagnosis in detail with patient.  
 
Medication risks/benefits/costs/interactions/alternatives discussed with patient. He was given an after visit summary which includes diagnoses, current medications, & vitals. He expressed understanding with the diagnosis and plan.

## 2018-07-31 NOTE — PATIENT INSTRUCTIONS
Follow a Mediterranean diet. Get regular aerobic activity. Consider taking a super B-complex vitamin every day. Consider taking an 81 mg aspirin daily. Learning About the [address] Diet What is the Mediterranean diet? The Mediterranean diet is a style of eating rather than a diet plan. It features foods eaten in [place], [place], [place] and [place], and other countries along the [facility]. It emphasizes eating foods like fish, fruits, vegetables, beans, high-fiber breads and whole grains, nuts, and olive oil. This style of eating includes limited red meat, cheese, and sweets. Why choose the Mediterranean diet? A Mediterranean-style diet may improve heart health. It contains more fat than other heart-healthy diets. But the fats are mainly from nuts, unsaturated oils (such as fish oils and olive oil), and certain nut or seed oils (such as canola, soybean, or flaxseed oil). These fats may help protect the heart and blood vessels. How can you get started on the Mediterranean diet? Here are some things you can do to switch to a more Mediterranean way of eating. What to eat · Eat a variety of fruits and vegetables each day, such as grapes, blueberries, tomatoes, broccoli, peppers, figs, olives, spinach, eggplant, beans, lentils, and chickpeas. · Eat a variety of whole-grain foods each day, such as oats, brown rice, and whole wheat bread, pasta, and couscous. · Eat fish at least 2 times a week. Try tuna, salmon, mackerel, lake trout, herring, or sardines. · Eat moderate amounts of low-fat dairy products, such as milk, cheese, or yogurt. · Eat moderate amounts of poultry and eggs. · Choose healthy (unsaturated) fats, such as nuts, olive oil, and certain nut or seed oils like canola, soybean, and flaxseed. · Limit unhealthy (saturated) fats, such as butter, palm oil, and coconut oil. And limit fats found in animal products, such as meat and dairy products made with whole milk.  Try to eat red meat only a few times a month in very small amounts. · Limit sweets and desserts to only a few times a week. This includes sugar-sweetened drinks like soda. The Mediterranean diet may also include red wine with your meal-1 glass each day for women and up to 2 glasses a day for men. Tips for eating at home · Use herbs, spices, garlic, lemon zest, and citrus juice instead of salt to add flavor to foods. · Add avocado slices to your sandwich instead of avelar. · Have fish for lunch or dinner instead of red meat. Brush the fish with olive oil, and broil or grill it. · Sprinkle your salad with seeds or nuts instead of cheese. · Cook with olive or canola oil instead of butter or oils that are high in saturated fat. · Switch from 2% milk or whole milk to 1% or fat-free milk. · Dip raw vegetables in a vinaigrette dressing or hummus instead of dips made from mayonnaise or sour cream. 
· Have a piece of fruit for dessert instead of a piece of cake. Try baked apples, or have some dried fruit. Tips for eating out · Try broiled, grilled, baked, or poached fish instead of having it fried or breaded. · Ask your  to have your meals prepared with olive oil instead of butter. · Order dishes made with marinara sauce or sauces made from olive oil. Avoid sauces made from cream or mayonnaise. · Choose whole-grain breads, whole wheat pasta and pizza crust, brown rice, beans, and lentils. · Cut back on butter or margarine on bread. Instead, you can dip your bread in a small amount of olive oil. · Ask for a side salad or grilled vegetables instead of french fries or chips. Where can you learn more? Go to http://maryanne-milo.info/. Enter [phone] in the search box to learn more about \"Learning About the Mediterranean Diet. \" Current as of: May 12, 2017 Content Version: 11.7 © 7239-1790 [employer].  Care instructions adapted under license by [employer] (which disclaims liability or warranty for this information). If you have questions about a medical condition or this instruction, always ask your healthcare professional. [address] any warranty or liability for your use of this information.

## 2018-07-31 NOTE — PROGRESS NOTES
Chief Complaint Patient presents with  Complete Physical  
 
Reviewed record in preparation for visit and have obtained necessary documentation. Identified pt with two pt identifiers(name and ). Health Maintenance Due Topic  DTaP/Tdap/Td series (1 - Tdap) Chief Complaint Patient presents with  Complete Physical  
  
 
Wt Readings from Last 3 Encounters:  
18 196 lb 11.2 oz (89.2 kg) 17 196 lb (88.9 kg) 16 193 lb (87.5 kg) Temp Readings from Last 3 Encounters:  
18 97.6 °F (36.4 °C) (Oral)  
17 98.2 °F (36.8 °C) (Oral) 16 97.9 °F (36.6 °C) (Oral) BP Readings from Last 3 Encounters:  
18 108/72  
17 130/70  
16 118/82 Pulse Readings from Last 3 Encounters:  
18 75  
17 77  
16 61 Learning Assessment: 
:  
 
Learning Assessment 2018 2015 3/26/2014 2013 PRIMARY LEARNER Patient Patient Patient Patient HIGHEST LEVEL OF EDUCATION - PRIMARY LEARNER  4 YEARS OF COLLEGE 4 YEARS OF COLLEGE 4 YEARS OF COLLEGE -  
BARRIERS PRIMARY LEARNER NONE NONE OTHER -  
CO-LEARNER CAREGIVER No No No - PRIMARY LANGUAGE ENGLISH ENGLISH ENGLISH ENGLISH  NEED - No - - LEARNER PREFERENCE PRIMARY DEMONSTRATION DEMONSTRATION DEMONSTRATION LISTENING  
  - - - PICTURES  
LEARNING SPECIAL TOPICS - no - -  
ANSWERED BY patient patient - patient RELATIONSHIP SELF SELF SELF SELF  
ASSESSMENT COMMENT - n/a - - Depression Screening: 
:  
 
PHQ over the last two weeks 2018 Little interest or pleasure in doing things Not at all Feeling down, depressed, irritable, or hopeless Not at all Total Score PHQ 2 0 Fall Risk Assessment: 
:  
 
No flowsheet data found. Abuse Screening: 
:  
 
Abuse Screening Questionnaire 2018 12/15/2014 Do you ever feel afraid of your partner? Hudson Notice Are you in a relationship with someone who physically or mentally threatens you?  Demetrice Marina N  
Is it safe for you to go home? Rebeka Samano Coordination of Care Questionnaire: 
:  
 
1) Have you been to an emergency room, urgent care clinic since your last visit? no  
Hospitalized since your last visit? no          
 
2) Have you seen or consulted any other health care providers outside of [address] [place] [address] since your last visit? no  (Include any pap smears or colon screenings in this section.) 3) Do you have an Advance Directive on file? no 
 
4) Are you interested in receiving information on Advance Directives? NO Patient is accompanied by self I have received verbal consent from Rose Mata to discuss any/all medical information while they are present in the room. Reviewed record  In preparation for visit and have obtained necessary documentation.

## 2018-08-01 LAB
ALBUMIN SERPL-MCNC: 4.8 G/DL (ref 3.5–5.5)
ALBUMIN/GLOB SERPL: 1.8 {RATIO} (ref 1.2–2.2)
ALP SERPL-CCNC: 49 IU/L (ref 39–117)
ALT SERPL-CCNC: 20 IU/L (ref 0–44)
APPEARANCE UR: CLEAR
AST SERPL-CCNC: 21 IU/L (ref 0–40)
BILIRUB SERPL-MCNC: 0.4 MG/DL (ref 0–1.2)
BILIRUB UR QL STRIP: NEGATIVE
BUN SERPL-MCNC: 14 MG/DL (ref 6–24)
BUN/CREAT SERPL: 14 (ref 9–20)
CALCIUM SERPL-MCNC: 9.7 MG/DL (ref 8.7–10.2)
CHLORIDE SERPL-SCNC: 102 MMOL/L (ref 96–106)
CHOLEST SERPL-MCNC: 204 MG/DL (ref 100–199)
CO2 SERPL-SCNC: 24 MMOL/L (ref 20–29)
COLOR UR: YELLOW
CREAT SERPL-MCNC: 1.02 MG/DL (ref 0.76–1.27)
GLOBULIN SER CALC-MCNC: 2.7 G/DL (ref 1.5–4.5)
GLUCOSE SERPL-MCNC: 99 MG/DL (ref 65–99)
GLUCOSE UR QL: NEGATIVE
HDLC SERPL-MCNC: 40 MG/DL
HGB UR QL STRIP: NEGATIVE
INTERPRETATION, 910389: NORMAL
KETONES UR QL STRIP: NEGATIVE
LDLC SERPL CALC-MCNC: 143 MG/DL (ref 0–99)
LEUKOCYTE ESTERASE UR QL STRIP: NEGATIVE
MICRO URNS: ABNORMAL
NITRITE UR QL STRIP: NEGATIVE
PH UR STRIP: 8.5 [PH] (ref 5–7.5)
POTASSIUM SERPL-SCNC: 5.2 MMOL/L (ref 3.5–5.2)
PROT SERPL-MCNC: 7.5 G/DL (ref 6–8.5)
PROT UR QL STRIP: ABNORMAL
PSA SERPL-MCNC: 0.3 NG/ML (ref 0–4)
REFLEX CRITERIA: NORMAL
SODIUM SERPL-SCNC: 142 MMOL/L (ref 134–144)
SP GR UR: 1.03 (ref 1–1.03)
TRIGL SERPL-MCNC: 107 MG/DL (ref 0–149)
UROBILINOGEN UR STRIP-MCNC: 0.2 MG/DL (ref 0.2–1)
VLDLC SERPL CALC-MCNC: 21 MG/DL (ref 5–40)

## 2018-09-13 DIAGNOSIS — F98.8 ATTENTION DEFICIT DISORDER (ADD) WITHOUT HYPERACTIVITY: ICD-10-CM

## 2018-09-13 RX ORDER — DEXTROAMPHETAMINE SACCHARATE, AMPHETAMINE ASPARTATE, DEXTROAMPHETAMINE SULFATE AND AMPHETAMINE SULFATE 2.5; 2.5; 2.5; 2.5 MG/1; MG/1; MG/1; MG/1
10 TABLET ORAL
Qty: 60 TAB | Refills: 0 | Status: SHIPPED | OUTPATIENT
Start: 2018-09-13 | End: 2018-11-15 | Stop reason: SDUPTHER

## 2018-09-13 NOTE — TELEPHONE ENCOUNTER
Requested Prescriptions     Pending Prescriptions Disp Refills    dextroamphetamine-amphetamine (ADDERALL) 10 mg tablet 60 Tab 0     Sig: Take 1 Tab (10 mg total) by mouth two (2) times daily as neededIndications: Attention-Deficit Hyperactivity Disorder.   Max Daily Amount: 20 mg       Pt's wife would like a call when Rx is ready for  [phone]    Last OV: 7/31/18  Next OV: 8/2/19

## 2018-11-15 DIAGNOSIS — F98.8 ATTENTION DEFICIT DISORDER (ADD) WITHOUT HYPERACTIVITY: ICD-10-CM

## 2018-11-15 RX ORDER — DEXTROAMPHETAMINE SACCHARATE, AMPHETAMINE ASPARTATE, DEXTROAMPHETAMINE SULFATE AND AMPHETAMINE SULFATE 2.5; 2.5; 2.5; 2.5 MG/1; MG/1; MG/1; MG/1
10 TABLET ORAL
Qty: 60 TAB | Refills: 0 | Status: SHIPPED | OUTPATIENT
Start: 2018-11-15 | End: 2018-12-24 | Stop reason: SDUPTHER

## 2018-11-15 NOTE — TELEPHONE ENCOUNTER
Requested Prescriptions     Pending Prescriptions Disp Refills    dextroamphetamine-amphetamine (ADDERALL) 10 mg tablet 60 Tab 0     Sig: Take 1 Tab (10 mg total) by mouth two (2) times daily as needed. Max Daily Amount: 20 mg     Velvet Greg             Mrs. Oseguera [place], Pt wife requesting a refill on prescription: \"Adderrall\".  Mrs. Tamir Ortega best contact number is [phone].      07/31/2018 08/02/2019

## 2018-12-24 DIAGNOSIS — F98.8 ATTENTION DEFICIT DISORDER (ADD) WITHOUT HYPERACTIVITY: ICD-10-CM

## 2018-12-24 NOTE — TELEPHONE ENCOUNTER
Requested Prescriptions     Pending Prescriptions Disp Refills    dextroamphetamine-amphetamine (ADDERALL) 10 mg tablet 60 Tab 0     Sig: Take 1 Tab (10 mg total) by mouth two (2) times daily as needed. Max Daily Amount: 20 mg     07/31/18  No upcoming appt, not due till July 2019.

## 2018-12-26 RX ORDER — DEXTROAMPHETAMINE SACCHARATE, AMPHETAMINE ASPARTATE, DEXTROAMPHETAMINE SULFATE AND AMPHETAMINE SULFATE 2.5; 2.5; 2.5; 2.5 MG/1; MG/1; MG/1; MG/1
10 TABLET ORAL
Qty: 60 TAB | Refills: 0 | Status: SHIPPED | OUTPATIENT
Start: 2018-12-26 | End: 2019-01-24 | Stop reason: SDUPTHER

## 2019-01-24 DIAGNOSIS — F98.8 ATTENTION DEFICIT DISORDER (ADD) WITHOUT HYPERACTIVITY: ICD-10-CM

## 2019-01-24 NOTE — TELEPHONE ENCOUNTER
Requested Prescriptions     Pending Prescriptions Disp Refills    dextroamphetamine-amphetamine (ADDERALL) 10 mg tablet 60 Tab 0     Sig: Take 1 Tab (10 mg total) by mouth two (2) times daily as needed. Max Daily Amount: 20 mg       Advised pt's wife that Dr. Corrie Espino is no longer with the practice. Pt's wife states that she will look for new pcp for patient but would like script refilled in the mean time.

## 2019-01-25 RX ORDER — DEXTROAMPHETAMINE SACCHARATE, AMPHETAMINE ASPARTATE, DEXTROAMPHETAMINE SULFATE AND AMPHETAMINE SULFATE 2.5; 2.5; 2.5; 2.5 MG/1; MG/1; MG/1; MG/1
10 TABLET ORAL
Qty: 60 TAB | Refills: 0 | Status: SHIPPED | OUTPATIENT
Start: 2019-01-25 | End: 2020-01-02 | Stop reason: SDUPTHER

## 2019-09-25 ENCOUNTER — DOCUMENTATION ONLY (OUTPATIENT)
Dept: INTERNAL MEDICINE CLINIC | Age: 39
End: 2019-09-25

## 2019-09-25 NOTE — PROGRESS NOTES
dextroamphetamine-amphetamine (ADDERALL) 10 mg tablet Rx written 7/5/18 by Dr. Gill Bending shredded due to patient non .

## 2020-01-02 ENCOUNTER — OFFICE VISIT (OUTPATIENT)
Dept: INTERNAL MEDICINE CLINIC | Age: 40
End: 2020-01-02

## 2020-01-02 VITALS
DIASTOLIC BLOOD PRESSURE: 80 MMHG | BODY MASS INDEX: 31.19 KG/M2 | OXYGEN SATURATION: 99 % | HEIGHT: 69 IN | WEIGHT: 210.6 LBS | RESPIRATION RATE: 16 BRPM | TEMPERATURE: 98.6 F | HEART RATE: 83 BPM | SYSTOLIC BLOOD PRESSURE: 128 MMHG

## 2020-01-02 DIAGNOSIS — F98.8 ATTENTION DEFICIT DISORDER (ADD) WITHOUT HYPERACTIVITY: Primary | ICD-10-CM

## 2020-01-02 DIAGNOSIS — E78.00 ELEVATED LDL CHOLESTEROL LEVEL: ICD-10-CM

## 2020-01-02 RX ORDER — DEXTROAMPHETAMINE SACCHARATE, AMPHETAMINE ASPARTATE, DEXTROAMPHETAMINE SULFATE AND AMPHETAMINE SULFATE 2.5; 2.5; 2.5; 2.5 MG/1; MG/1; MG/1; MG/1
10 TABLET ORAL
Qty: 60 TAB | Refills: 0 | Status: SHIPPED | OUTPATIENT
Start: 2020-01-02 | End: 2020-02-17 | Stop reason: SDUPTHER

## 2020-01-02 NOTE — PROGRESS NOTES
Chief Complaint   Patient presents with    Medication Refill     Reviewed record in preparation for visit and have obtained necessary documentation. Identified pt with two pt identifiers(name and ). Health Maintenance Due   Topic    DTaP/Tdap/Td series (1 - Tdap)    Influenza Age 5 to Adult          Chief Complaint   Patient presents with    Medication Refill        Wt Readings from Last 3 Encounters:   20 210 lb 9.6 oz (95.5 kg)   18 196 lb 11.2 oz (89.2 kg)   17 196 lb (88.9 kg)     Temp Readings from Last 3 Encounters:   20 98.6 °F (37 °C) (Oral)   18 97.6 °F (36.4 °C) (Oral)   17 98.2 °F (36.8 °C) (Oral)     BP Readings from Last 3 Encounters:   20 128/80   18 108/72   17 130/70     Pulse Readings from Last 3 Encounters:   20 83   18 75   17 77           Learning Assessment:  :     Learning Assessment 2018 2015 3/26/2014 2013   PRIMARY LEARNER Patient Patient Patient Patient   HIGHEST LEVEL OF EDUCATION - PRIMARY LEARNER  4 YEARS OF COLLEGE 4 YEARS OF COLLEGE 4 YEARS OF COLLEGE -   BARRIERS PRIMARY LEARNER NONE NONE OTHER -   CO-LEARNER CAREGIVER No No No -   PRIMARY LANGUAGE ENGLISH ENGLISH ENGLISH ENGLISH    NEED - No - -   LEARNER PREFERENCE PRIMARY DEMONSTRATION DEMONSTRATION DEMONSTRATION LISTENING     - - - PICTURES   LEARNING SPECIAL TOPICS - no - -   ANSWERED BY patient patient - patient   RELATIONSHIP SELF SELF SELF SELF   ASSESSMENT COMMENT - n/a - -       Depression Screening:  :     3 most recent PHQ Screens 2020   Little interest or pleasure in doing things Not at all   Feeling down, depressed, irritable, or hopeless Not at all   Total Score PHQ 2 0       Fall Risk Assessment:  :     No flowsheet data found. Abuse Screening:  :     Abuse Screening Questionnaire 2018 12/15/2014   Do you ever feel afraid of your partner?  N N   Are you in a relationship with someone who physically or mentally threatens you? N N   Is it safe for you to go home? Y Y       Coordination of Care Questionnaire:  :     1) Have you been to an emergency room, urgent care clinic since your last visit? no   Hospitalized since your last visit? no             2) Have you seen or consulted any other health care providers outside of [address] [place] [address] since your last visit? no  (Include any pap smears or colon screenings in this section.)    3) Do you have an Advance Directive on file? no    4) Are you interested in receiving information on Advance Directives? NO      Patient is accompanied by self I have received verbal consent from Joshua Ragsdale to discuss any/all medical information while they are present in the room. Reviewed record  In preparation for visit and have obtained necessary documentation.

## 2020-01-02 NOTE — PROGRESS NOTES
36 yo male last seen in this practice in July 2018 is here today for med refills. He has been on Adderall, but last refill by our office was Jan 2019 ( consulted - last filled Feb 7, 2019). He is here for Adderall rx which he hasn't had since that last refill. He has had a life stress and has made the Adderall last as long as he could, but he now feels like he needs it again for focus. He reports he had testing a number of years ago while seeing Dr. Rigo Blackburn, but doesn't remember where/when. His sleep he reports is good with an average of 6-7 hours. He feels rested in AMs. Exercise is not regular currently. He has no exertional chest discomfort. Not a tobacco user, and alcohol use is occasional.  No FH of heart disease. LDL was elevated at last check in July 2018. He does pay attention to diet overall. PE: WNWD WM is alert   Weight - up 10 lbs from 07/2018 (BMI 30)   BP = 128/80   Heart - RRR   Lungs - clear    Imp: ADD   LDL elevation    Plan: He is advised to establish with new PCP - names of groups given   Labs today   Refill short-acting Adderall 10 mg - BID prn    Discussed advisability of regular exercise and weight reduction  _________________________  Expected course of current diagnosed problem(s) as well as expected progression and possible complications, and desired follow up with provider are discussed with patient. Patient is encouraged to be back in touch with any questions or concerns. Patient expresses understanding of plan of care. Patient is given AVS which includes diagnoses, current medications, vitals.

## 2020-01-02 NOTE — PATIENT INSTRUCTIONS
Your Primary Care Provider has left our practice, and we are unable to continue your care at this [facility] site. We would suggest establishing your on-going Primary Care with another [facility] practice - please do this SOON for continued prescription refills.   Several of these are:      [facility] @ [address] [place] [address]  [address] @ [id number] [place] [address]  [phone]     Please check with your insurance to be sure the chosen provider is in network with your current plan.  _______________________________________    Regular daily exercise such as walking for 30 minutes    Weight reduction of 20 lbs

## 2020-01-03 LAB
ALBUMIN SERPL-MCNC: 4.8 G/DL (ref 3.5–5.5)
ALBUMIN/GLOB SERPL: 1.7 {RATIO} (ref 1.2–2.2)
ALP SERPL-CCNC: 50 IU/L (ref 39–117)
ALT SERPL-CCNC: 45 IU/L (ref 0–44)
AST SERPL-CCNC: 30 IU/L (ref 0–40)
BASOPHILS # BLD AUTO: 0.1 X10E3/UL (ref 0–0.2)
BASOPHILS NFR BLD AUTO: 1 %
BILIRUB SERPL-MCNC: 0.3 MG/DL (ref 0–1.2)
BUN SERPL-MCNC: 15 MG/DL (ref 6–24)
BUN/CREAT SERPL: 15 (ref 9–20)
CALCIUM SERPL-MCNC: 9.5 MG/DL (ref 8.7–10.2)
CHLORIDE SERPL-SCNC: 100 MMOL/L (ref 96–106)
CHOLEST SERPL-MCNC: 296 MG/DL (ref 100–199)
CO2 SERPL-SCNC: 23 MMOL/L (ref 20–29)
COMMENT, 011824: ABNORMAL
CREAT SERPL-MCNC: 0.97 MG/DL (ref 0.76–1.27)
EOSINOPHIL # BLD AUTO: 0.2 X10E3/UL (ref 0–0.4)
EOSINOPHIL NFR BLD AUTO: 3 %
ERYTHROCYTE [DISTWIDTH] IN BLOOD BY AUTOMATED COUNT: 12.7 % (ref 12.3–15.4)
GLOBULIN SER CALC-MCNC: 2.8 G/DL (ref 1.5–4.5)
GLUCOSE SERPL-MCNC: 92 MG/DL (ref 65–99)
HCT VFR BLD AUTO: 44.1 % (ref 37.5–51)
HDLC SERPL-MCNC: 44 MG/DL
HGB BLD-MCNC: 15 G/DL (ref 13–17.7)
IMM GRANULOCYTES # BLD AUTO: 0 X10E3/UL (ref 0–0.1)
IMM GRANULOCYTES NFR BLD AUTO: 0 %
LDLC SERPL CALC-MCNC: 193 MG/DL (ref 0–99)
LYMPHOCYTES # BLD AUTO: 2.3 X10E3/UL (ref 0.7–3.1)
LYMPHOCYTES NFR BLD AUTO: 26 %
MCH RBC QN AUTO: 31.3 PG (ref 26.6–33)
MCHC RBC AUTO-ENTMCNC: 34 G/DL (ref 31.5–35.7)
MCV RBC AUTO: 92 FL (ref 79–97)
MONOCYTES # BLD AUTO: 0.7 X10E3/UL (ref 0.1–0.9)
MONOCYTES NFR BLD AUTO: 9 %
NEUTROPHILS # BLD AUTO: 5.2 X10E3/UL (ref 1.4–7)
NEUTROPHILS NFR BLD AUTO: 61 %
PLATELET # BLD AUTO: 256 X10E3/UL (ref 150–450)
POTASSIUM SERPL-SCNC: 5.2 MMOL/L (ref 3.5–5.2)
PROT SERPL-MCNC: 7.6 G/DL (ref 6–8.5)
RBC # BLD AUTO: 4.8 X10E6/UL (ref 4.14–5.8)
SODIUM SERPL-SCNC: 139 MMOL/L (ref 134–144)
TRIGL SERPL-MCNC: 293 MG/DL (ref 0–149)
VLDLC SERPL CALC-MCNC: 59 MG/DL (ref 5–40)
WBC # BLD AUTO: 8.5 X10E3/UL (ref 3.4–10.8)

## 2020-02-17 DIAGNOSIS — F98.8 ATTENTION DEFICIT DISORDER (ADD) WITHOUT HYPERACTIVITY: ICD-10-CM

## 2020-02-18 RX ORDER — DEXTROAMPHETAMINE SACCHARATE, AMPHETAMINE ASPARTATE, DEXTROAMPHETAMINE SULFATE AND AMPHETAMINE SULFATE 2.5; 2.5; 2.5; 2.5 MG/1; MG/1; MG/1; MG/1
10 TABLET ORAL
Qty: 60 TAB | Refills: 0 | Status: SHIPPED | OUTPATIENT
Start: 2020-02-18 | End: 2020-03-23 | Stop reason: SDUPTHER

## 2020-03-23 DIAGNOSIS — F98.8 ATTENTION DEFICIT DISORDER (ADD) WITHOUT HYPERACTIVITY: ICD-10-CM

## 2020-03-23 RX ORDER — DEXTROAMPHETAMINE SACCHARATE, AMPHETAMINE ASPARTATE, DEXTROAMPHETAMINE SULFATE AND AMPHETAMINE SULFATE 2.5; 2.5; 2.5; 2.5 MG/1; MG/1; MG/1; MG/1
10 TABLET ORAL
Qty: 60 TAB | Refills: 0 | Status: SHIPPED | OUTPATIENT
Start: 2020-03-23 | End: 2020-04-24 | Stop reason: SDUPTHER

## 2020-03-23 NOTE — TELEPHONE ENCOUNTER
consulted. Adderall last filled 2/18/2020. Last OV Jan 2020. Will establish w/ new PCP, Humberto Richards NP, on 4/27.

## 2020-04-20 ENCOUNTER — TELEPHONE (OUTPATIENT)
Dept: FAMILY MEDICINE CLINIC | Age: 40
End: 2020-04-20

## 2020-04-20 NOTE — TELEPHONE ENCOUNTER
Outbound call placed to patient. No answer. Left message for patient to give us a call back regarding upcoming appointment. Patients appointment needs to be rescheduled or converted to Virtual visit if patient is willing due to COVID-19 pandemic. Patient can be scheduled for virtual visit with Chapis Ken after June 1st or come into the office after July 1st. Or patient may see other provider virtual before then.    PSR can reschedule

## 2020-04-24 DIAGNOSIS — F98.8 ATTENTION DEFICIT DISORDER (ADD) WITHOUT HYPERACTIVITY: ICD-10-CM

## 2020-04-24 RX ORDER — DEXTROAMPHETAMINE SACCHARATE, AMPHETAMINE ASPARTATE, DEXTROAMPHETAMINE SULFATE AND AMPHETAMINE SULFATE 2.5; 2.5; 2.5; 2.5 MG/1; MG/1; MG/1; MG/1
10 TABLET ORAL
Qty: 60 TAB | Refills: 0 | Status: SHIPPED | OUTPATIENT
Start: 2020-04-24 | End: 2020-05-22 | Stop reason: SDUPTHER

## 2020-04-24 NOTE — TELEPHONE ENCOUNTER
Pt was supposed to see a new dr at [facility]. The office had to call to cancel appt     Requested Prescriptions     Pending Prescriptions Disp Refills    dextroamphetamine-amphetamine (ADDERALL) 10 mg tablet 60 Tab 0     Sig: Take 1 Tab by mouth two (2) times daily as needed (for concentration/focus). Max Daily Amount: 20 mg.  Indications: attention deficit disorder with hyperactivity     [facility] [address] [place] [address]

## 2020-05-22 DIAGNOSIS — F98.8 ATTENTION DEFICIT DISORDER (ADD) WITHOUT HYPERACTIVITY: ICD-10-CM

## 2020-05-22 RX ORDER — DEXTROAMPHETAMINE SACCHARATE, AMPHETAMINE ASPARTATE, DEXTROAMPHETAMINE SULFATE AND AMPHETAMINE SULFATE 2.5; 2.5; 2.5; 2.5 MG/1; MG/1; MG/1; MG/1
10 TABLET ORAL
Qty: 60 TAB | Refills: 0 | Status: SHIPPED | OUTPATIENT
Start: 2020-05-22 | End: 2020-06-23 | Stop reason: SDUPTHER

## 2020-05-29 ENCOUNTER — TELEPHONE (OUTPATIENT)
Dept: FAMILY MEDICINE CLINIC | Age: 40
End: 2020-05-29

## 2020-05-29 NOTE — TELEPHONE ENCOUNTER
Outbound call to patient at [phone] no answer left a message for a return call.     PSR please reschedule patient's virtual visit with any available provider or NP Michaelainger anytime after 7/21

## 2020-06-23 ENCOUNTER — TELEPHONE (OUTPATIENT)
Dept: FAMILY MEDICINE CLINIC | Age: 40
End: 2020-06-23

## 2020-06-23 DIAGNOSIS — F98.8 ATTENTION DEFICIT DISORDER (ADD) WITHOUT HYPERACTIVITY: ICD-10-CM

## 2020-06-23 RX ORDER — DEXTROAMPHETAMINE SACCHARATE, AMPHETAMINE ASPARTATE, DEXTROAMPHETAMINE SULFATE AND AMPHETAMINE SULFATE 2.5; 2.5; 2.5; 2.5 MG/1; MG/1; MG/1; MG/1
10 TABLET ORAL
Qty: 60 TAB | Refills: 0 | Status: SHIPPED | OUTPATIENT
Start: 2020-06-23 | End: 2020-07-21 | Stop reason: SDUPTHER

## 2020-06-23 NOTE — TELEPHONE ENCOUNTER
Last OV: 1/02/2020 - was advised to establish for on-going Primary Care with new PCP  Next OV: 6/29/2020 with Sammy Munson NP - 4 appointments with this provider have been canceled with 2 appearing to have been by patient and 2 by provider/scheduling    : Leona last filled: 5/22/2020 for #60

## 2020-06-23 NOTE — TELEPHONE ENCOUNTER
Outbound call to patient no answer left a message for a return call to reschedule appointment with RUDI Perrin on 6/29/20 to another provider or with RUDI Perrin after 7/21/20.       [id number] [address]anci laughlin [facility] to schedule

## 2020-07-21 ENCOUNTER — VIRTUAL VISIT (OUTPATIENT)
Dept: FAMILY MEDICINE CLINIC | Age: 40
End: 2020-07-21

## 2020-07-21 ENCOUNTER — TELEPHONE (OUTPATIENT)
Dept: FAMILY MEDICINE CLINIC | Age: 40
End: 2020-07-21

## 2020-07-21 DIAGNOSIS — E78.2 MIXED HYPERLIPIDEMIA: ICD-10-CM

## 2020-07-21 DIAGNOSIS — F98.8 ATTENTION DEFICIT DISORDER (ADD) WITHOUT HYPERACTIVITY: ICD-10-CM

## 2020-07-21 DIAGNOSIS — R79.89 ELEVATED LFTS: ICD-10-CM

## 2020-07-21 DIAGNOSIS — Z76.89 ENCOUNTER TO ESTABLISH CARE WITH NEW DOCTOR: Primary | ICD-10-CM

## 2020-07-21 RX ORDER — CHLORPHENIRAMINE MALEATE, IBUPROFEN, PSEUDOEPHEDRINE HCL 2; 200; 30 MG/1; MG/1; MG/1
TABLET, COATED ORAL
COMMUNITY

## 2020-07-21 RX ORDER — DEXTROAMPHETAMINE SACCHARATE, AMPHETAMINE ASPARTATE, DEXTROAMPHETAMINE SULFATE AND AMPHETAMINE SULFATE 2.5; 2.5; 2.5; 2.5 MG/1; MG/1; MG/1; MG/1
10 TABLET ORAL
Qty: 60 TAB | Refills: 0 | Status: SHIPPED | OUTPATIENT
Start: 2020-08-21 | End: 2020-07-21 | Stop reason: SDUPTHER

## 2020-07-21 RX ORDER — DEXTROAMPHETAMINE SACCHARATE, AMPHETAMINE ASPARTATE, DEXTROAMPHETAMINE SULFATE AND AMPHETAMINE SULFATE 2.5; 2.5; 2.5; 2.5 MG/1; MG/1; MG/1; MG/1
10 TABLET ORAL
Qty: 60 TAB | Refills: 0 | Status: SHIPPED | OUTPATIENT
Start: 2020-07-21 | End: 2020-07-21 | Stop reason: SDUPTHER

## 2020-07-21 RX ORDER — DEXTROAMPHETAMINE SACCHARATE, AMPHETAMINE ASPARTATE, DEXTROAMPHETAMINE SULFATE AND AMPHETAMINE SULFATE 2.5; 2.5; 2.5; 2.5 MG/1; MG/1; MG/1; MG/1
10 TABLET ORAL
Qty: 60 TAB | Refills: 0 | Status: SHIPPED | OUTPATIENT
Start: 2020-09-21 | End: 2020-11-02 | Stop reason: SDUPTHER

## 2020-07-21 NOTE — PROGRESS NOTES
Chief Complaint   Patient presents with   Shona Hernandez [facility]    Behavioral Problem     1. Have you been to the ER, urgent care clinic since your last visit? Hospitalized since your last visit? No    2. Have you seen or consulted any other health care providers outside of the [address] [place] [address] since your last visit? Include any pap smears or colon screening.  No

## 2020-07-21 NOTE — PROGRESS NOTES
HPI:   Jono Dunne is a 41 y.o. male who presents to [facility]. ADHD   Jono Dunne is compliant with treatment regimen. Patient denies chest pain, shortness of breath, weight loss, insomnia, or difficulty with appetite. Symptoms are currently well controlled. Patient denies personal or family history of drug abuse. Previous neuropsychiatric evaluation is not on file. Controlled substance contract is not on file. He is . He has two boys. He works in sales. He is followed by dermatology for a family history of melanoma. He reports a few drinks a week. He is active. Current Outpatient Medications   Medication Sig Dispense Refill    chlorphen-pseudoeph-ibuprofen (Advil Allergy Sinus) 2- mg tab Take  by mouth.  [START ON 9/21/2020] dextroamphetamine-amphetamine (ADDERALL) 10 mg tablet Take 1 Tab by mouth two (2) times daily as needed (for concentration/focus). Max Daily Amount: 20 mg. Indications: attention deficit disorder with hyperactivity 60 Tab 0      No Known Allergies   Patient Active Problem List   Diagnosis Code    ADD (attention deficit disorder) F98.8    Sleep disturbance G47.9    Chronic allergic rhinitis J30.9    Hypertriglyceridemia E78.1    FHx: colon cancer Z80.0    FHx: melanoma Z80.8     Past Medical History:   Diagnosis Date    ADD (attention deficit disorder)     ADHD     Chronic allergic rhinitis     Dr. Denice Olivares FHx: colon cancer     maternal aunt dx'd at age 39    FHx: melanoma     father, PGF    Hypertriglyceridemia 12/16/14    Overweight(278.02)     Reactive airway disease       History reviewed. No pertinent surgical history. No LMP for male patient.    Family History   Problem Relation Age of Onset    Breast Cancer Mother 50    Melanoma Father 54        melanoma    High Cholesterol Father     Cancer Paternal Grandfather         melanoma    Cancer Maternal Aunt         colon (dx'd at 38 yo)      Social History Socioeconomic History    Marital status:      Spouse name: Not on file    Number of children: Not on file    Years of education: Not on file    Highest education level: Not on file   Occupational History    Not on file   Social Needs    Financial resource strain: Not on file    Food insecurity     Worry: Not on file     Inability: Not on file    Transportation needs     Medical: Not on file     Non-medical: Not on file   Tobacco Use    Smoking status: Never Smoker    Smokeless tobacco: Never Used   Substance and Sexual Activity    Alcohol use: Not Currently     Comment: ocassional     Drug use: No    Sexual activity: Yes     Partners: Female   Lifestyle    Physical activity     Days per week: Not on file     Minutes per session: Not on file    Stress: Not on file   Relationships    Social connections     Talks on phone: Not on file     Gets together: Not on file     Attends Taoist service: Not on file     Active member of club or organization: Not on file     Attends meetings of clubs or organizations: Not on file     Relationship status: Not on file    Intimate partner violence     Fear of current or ex partner: Not on file     Emotionally abused: Not on file     Physically abused: Not on file     Forced sexual activity: Not on file   Other Topics Concern    Not on file   Social History Narrative    Not on file        ROS:   Review of Systems   Constitutional: Negative for fever, malaise/fatigue and weight loss. HENT: Negative for hearing loss. Eyes: Negative for blurred vision and pain. Respiratory: Negative for cough and shortness of breath. Cardiovascular: Negative for chest pain, palpitations and leg swelling. Gastrointestinal: Negative for abdominal pain, blood in stool, constipation, diarrhea and melena. Genitourinary: Negative for dysuria and hematuria. Musculoskeletal: Negative for joint pain. Skin: Negative for rash. Neurological: Negative for headaches. Psychiatric/Behavioral: Negative for depression. The patient is not nervous/anxious and does not have insomnia. Physical Exam:   There were no vitals taken for this visit. Vitals and Nurse Documentation reviewed. Physical Exam  Constitutional:       Appearance: Normal appearance. Neurological:      Mental Status: He is alert. Psychiatric:         Attention and Perception: Attention normal.         Mood and Affect: Mood normal.         Speech: Speech normal.         Behavior: Behavior normal.         Cognition and Memory: Cognition normal.           Assessment/ Plan:   [employer] were discussed including hours of operation, on-call providers, and MyChart. Diagnoses and all orders for this visit:    1. Encounter to establish care with new doctor    2. Mixed hyperlipidemia  -     LIPID PANEL  No prior treatment. Return fasting for recheck. 3. Attention deficit disorder (ADD) without hyperactivity  -     10-DRUG SCREEN W/CONF  -     dextroamphetamine-amphetamine (ADDERALL) 10 mg tablet; Take 1 Tab by mouth two (2) times daily as needed (for concentration/focus). Max Daily Amount: 20 mg. Indications: attention deficit disorder with hyperactivity  Length of supply given today: 3 months  Last Urine Drug Screen: due, ordered today. Change to Dosage: no stable on current therapy.  Reviewed: Yes  Referred for neuropsychiatric evaluation. Follow up in three months or sooner as needed. 4. Elevated LFTs  -     HEPATIC FUNCTION PANEL  He has lost some weight. Recheck labs. Follow-up and Dispositions    · Return in about 3 months (around 10/21/2020) for Televisit.           The patient has consented for synchronous (real-time) Telemedicine (audio technology) on  7/21/20   for their care to be delivered over telemedicine in place of their regularly scheduled office visit pursuant to the emergency declaration under the [facility] waiver authority and the Coronavirus Preparedness and Response Supplemental Appropriations Act, this Virtual  Visit was conducted, with patient's consent, to reduce the patient's risk of exposure to COVID-19 and provide continuity of care for an established patient.  Visit Length:

## 2020-07-21 NOTE — TELEPHONE ENCOUNTER
Outbound call to patient no answer left a message for him to call back and schedule a lab appointment and 3 month virtual visit with RUDI Perrin. PSR ok to schedule .

## 2020-08-22 ENCOUNTER — TELEPHONE (OUTPATIENT)
Dept: FAMILY MEDICINE CLINIC | Age: 40
End: 2020-08-22

## 2020-08-22 NOTE — TELEPHONE ENCOUNTER
----- Message from [facility] sent at 8/22/2020 10:40 AM EDT -----  Regarding: Aydin NP/Telephone  Patient return call    Caller's first and last name and relationship (if not the patient): Magdalena Agustin Wife       Best contact number(s): [phone]      Whose call is being returned:Varsha Monahan, AARON that called him about lab work he suppose to schedule       Details to clarify the request:      Sharita Dominguez

## 2020-08-26 ENCOUNTER — TELEPHONE (OUTPATIENT)
Dept: FAMILY MEDICINE CLINIC | Age: 40
End: 2020-08-26

## 2020-08-26 NOTE — TELEPHONE ENCOUNTER
----- Message from Tamara Cartwright sent at 8/24/2020  3:40 PM EDT -----  Regarding: RUDI Perrin/telephone  Contact: [phone]  Caller's first and last name and relationship to patient (if not the patient): Mrs. Burks [place] contact number: [phone]  Preferred date and time: n/a  Scheduled appointment date and time: n/a   Reason for appointment: Lab work needed prior to next appointment  Details to clarify the request: Patient is leaving for out of town work on Wed 8/26/2020 at 5am, would like to have lab work done before leaving if at all possible.

## 2020-08-26 NOTE — TELEPHONE ENCOUNTER
Outbound call to patients wife. Patients wife request lab orders to be printed and placed up front for  to be taken to another lab [facility] location. Lab orders printed and placed up front for .

## 2020-08-27 ENCOUNTER — TELEPHONE (OUTPATIENT)
Dept: FAMILY MEDICINE CLINIC | Age: 40
End: 2020-08-27

## 2020-08-27 DIAGNOSIS — F98.8 ATTENTION DEFICIT DISORDER (ADD) WITHOUT HYPERACTIVITY: Primary | ICD-10-CM

## 2020-08-27 NOTE — TELEPHONE ENCOUNTER
Pt's wife Smita Lei came into office to  lab orders for pt. At this time pt's wife inquired about pt being referred out to neuropsychiatric evaluation. Pt does not have information to who he was referred to. I do not see a referral on file. Please call and advise of this information. If she does not  a message can be left per Smita Lei.  Good contact number is [phone]

## 2020-09-03 NOTE — TELEPHONE ENCOUNTER
[phone]- left message with information for the referral the Pts Wife called in about prior.  Per [facility] it was okay to leave a VM with referral info if she did not answer

## 2020-09-23 LAB
ALBUMIN SERPL-MCNC: 4.9 G/DL (ref 4–5)
ALP SERPL-CCNC: 48 IU/L (ref 39–117)
ALT SERPL-CCNC: 40 IU/L (ref 0–44)
AMPHET UR CFM-MCNC: 1188 NG/ML
AMPHET UR QL CFM: POSITIVE
AMPHETAMINES UR QL SCN: NORMAL NG/ML
AMPHETAMINES UR QL: POSITIVE
AST SERPL-CCNC: 27 IU/L (ref 0–40)
BARBITURATES UR QL SCN: NEGATIVE NG/ML
BENZODIAZ UR QL SCN: NEGATIVE NG/ML
BILIRUB DIRECT SERPL-MCNC: 0.16 MG/DL (ref 0–0.4)
BILIRUB SERPL-MCNC: 0.7 MG/DL (ref 0–1.2)
BZE UR QL SCN: NEGATIVE NG/ML
CANNABINOIDS UR QL SCN: NEGATIVE NG/ML
CHOLEST SERPL-MCNC: 243 MG/DL (ref 100–199)
CREAT UR-MCNC: 139.9 MG/DL (ref 20–300)
HDLC SERPL-MCNC: 45 MG/DL
INTERPRETATION, 910389: NORMAL
LDLC SERPL CALC-MCNC: 157 MG/DL (ref 0–99)
METHADONE UR QL SCN: NEGATIVE NG/ML
METHAMPHET UR QL CFM: NEGATIVE
OPIATES UR QL SCN: NEGATIVE NG/ML
OXYCODONE+OXYMORPHONE UR QL SCN: NEGATIVE NG/ML
PCP UR QL: NEGATIVE NG/ML
PH UR: 5.3 [PH] (ref 4.5–8.9)
PLEASE NOTE:, 733157: NORMAL
PROPOXYPH UR QL SCN: NEGATIVE NG/ML
PROT SERPL-MCNC: 7.3 G/DL (ref 6–8.5)
TRIGL SERPL-MCNC: 220 MG/DL (ref 0–149)
VLDLC SERPL CALC-MCNC: 41 MG/DL (ref 5–40)

## 2020-10-21 DIAGNOSIS — F98.8 ATTENTION DEFICIT DISORDER (ADD) WITHOUT HYPERACTIVITY: ICD-10-CM

## 2020-10-21 NOTE — TELEPHONE ENCOUNTER
----- Message from Thealauren Cristal sent at 10/21/2020  2:46 PM EDT -----  Regarding: RUDI garcia/Telephone  Caller (if not patient): Oral Neighbor.  Relationship of caller (if not patient): Spouse  Best contact number(s):[phone]  Name of medication and dosage if known: adderall 10mgs  Is patient out of this medication (yes/no):  Pharmacy name: [address] [place] [address] listed in chart? (yes/no):   Pharmacy phone number:  Date of last visit:7/21/2020  Details to clarify the request:

## 2020-10-27 RX ORDER — DEXTROAMPHETAMINE SACCHARATE, AMPHETAMINE ASPARTATE, DEXTROAMPHETAMINE SULFATE AND AMPHETAMINE SULFATE 2.5; 2.5; 2.5; 2.5 MG/1; MG/1; MG/1; MG/1
10 TABLET ORAL
Qty: 60 TAB | Refills: 0 | OUTPATIENT
Start: 2020-10-27

## 2020-10-29 NOTE — TELEPHONE ENCOUNTER
Pt's wife called to follow up on pt's refill. Informed her that a f/u is needed as per the note. .. first she was upset why nobody called her since last week and till his  is totally out of his medication and secondly she was questioning why he needs to be seen again as he was recently seen in July. Please advice.     BCB: [phone]

## 2020-10-30 NOTE — TELEPHONE ENCOUNTER
[phone]- LVM for patients wife stating every 3 months we need to follow up with the patient for this specific medication and advised her to call the office at [phone] to schedule an appointment

## 2020-11-02 ENCOUNTER — VIRTUAL VISIT (OUTPATIENT)
Dept: FAMILY MEDICINE CLINIC | Age: 40
End: 2020-11-02
Payer: COMMERCIAL

## 2020-11-02 DIAGNOSIS — F98.8 ATTENTION DEFICIT DISORDER (ADD) WITHOUT HYPERACTIVITY: ICD-10-CM

## 2020-11-02 PROCEDURE — 99213 OFFICE O/P EST LOW 20 MIN: CPT | Performed by: NURSE PRACTITIONER

## 2020-11-02 RX ORDER — DEXTROAMPHETAMINE SACCHARATE, AMPHETAMINE ASPARTATE, DEXTROAMPHETAMINE SULFATE AND AMPHETAMINE SULFATE 2.5; 2.5; 2.5; 2.5 MG/1; MG/1; MG/1; MG/1
10 TABLET ORAL
Qty: 60 TAB | Refills: 0 | Status: SHIPPED | OUTPATIENT
Start: 2020-11-02 | End: 2020-11-02 | Stop reason: SDUPTHER

## 2020-11-02 RX ORDER — DEXTROAMPHETAMINE SACCHARATE, AMPHETAMINE ASPARTATE, DEXTROAMPHETAMINE SULFATE AND AMPHETAMINE SULFATE 2.5; 2.5; 2.5; 2.5 MG/1; MG/1; MG/1; MG/1
10 TABLET ORAL
Qty: 60 TAB | Refills: 0 | Status: SHIPPED | OUTPATIENT
Start: 2020-12-02 | End: 2020-11-02 | Stop reason: SDUPTHER

## 2020-11-02 RX ORDER — DEXTROAMPHETAMINE SACCHARATE, AMPHETAMINE ASPARTATE, DEXTROAMPHETAMINE SULFATE AND AMPHETAMINE SULFATE 2.5; 2.5; 2.5; 2.5 MG/1; MG/1; MG/1; MG/1
10 TABLET ORAL
Qty: 60 TAB | Refills: 0 | Status: SHIPPED | OUTPATIENT
Start: 2021-01-02 | End: 2021-01-26 | Stop reason: SDUPTHER

## 2020-11-02 NOTE — PROGRESS NOTES
Assessment/Plan:     Diagnoses and all orders for this visit:    1. Attention deficit disorder (ADD) without hyperactivity  -     dextroamphetamine-amphetamine (ADDERALL) 10 mg tablet; Take 1 Tab by mouth two (2) times daily as needed (for concentration/focus). Max Daily Amount: 20 mg. Indications: attention deficit disorder with hyperactivity      Length of supply given today: 3 months  Last Urine Drug Screen: up to date  Change to Dosage: No, stable on current therapy.  Reviewed: Yes    Follow-up and Dispositions    · Return in about 3 months (around 2/2/2021) for Follow Up. Discussed expected course/resolution/complications of diagnosis in detail with patient. Medication risks/benefits/costs/interactions/alternatives discussed with patient. Pt was given after visit summary which includes diagnoses, current medications & vitals. Pt expressed understanding with the diagnosis and plan    The patient has consented for synchronous (real-time) Telemedicine (audio-video technology) on 11/2/20 for their care to be delivered over telemedicine in place of their regularly scheduled office visit pursuant to the emergency declaration under the [facility] waiver authority and the [employer] Act, this Virtual  Visit was conducted by the practitioner who is located in the medical office in [place], [place], with patient's consent, to reduce the patient's risk of exposure to COVID-19 and provide continuity of care. Visit Length: 15 minutes. Subjective:      Jeremi Schafer is a 41 y.o. male who presents for had concerns including Medication Evaluation. ADHD   Jeremi Schafer is compliant with treatment regimen. Patient denies chest pain, shortness of breath, weight loss, insomnia, or difficulty with appetite. Symptoms are currently well controlled.   Patient denies personal or family history of drug abuse. Previous neuropsychiatric evaluation is on file. Controlled substance contract is on file. Patient Active Problem List   Diagnosis Code    ADD (attention deficit disorder) F98.8    Sleep disturbance G47.9    Chronic allergic rhinitis J30.9    Hypertriglyceridemia E78.1    FHx: colon cancer Z80.0    FHx: melanoma Z80.8       Current Outpatient Medications   Medication Sig Dispense Refill    [START ON 1/2/2021] dextroamphetamine-amphetamine (ADDERALL) 10 mg tablet Take 1 Tab by mouth two (2) times daily as needed (for concentration/focus). Max Daily Amount: 20 mg. Indications: attention deficit disorder with hyperactivity 60 Tab 0    chlorphen-pseudoeph-ibuprofen (Advil Allergy Sinus) 2- mg tab Take  by mouth. No Known Allergies    ROS:   Review of Systems   Constitutional: Negative for malaise/fatigue. Eyes: Negative for blurred vision. Respiratory: Negative for shortness of breath. Cardiovascular: Negative for chest pain. Objective: There were no vitals taken for this visit. Vitals and Nurse Documentation reviewed. Physical Exam  Constitutional:       Appearance: Normal appearance. Neurological:      Mental Status: He is alert.    Psychiatric:         Attention and Perception: Attention normal.         Mood and Affect: Mood normal.         Speech: Speech normal.         Behavior: Behavior normal.         Cognition and Memory: Cognition normal.

## 2021-01-26 ENCOUNTER — VIRTUAL VISIT (OUTPATIENT)
Dept: FAMILY MEDICINE CLINIC | Age: 41
End: 2021-01-26
Payer: COMMERCIAL

## 2021-01-26 DIAGNOSIS — F98.8 ATTENTION DEFICIT DISORDER (ADD) WITHOUT HYPERACTIVITY: Primary | ICD-10-CM

## 2021-01-26 PROCEDURE — 99442 PR PHYS/QHP TELEPHONE EVALUATION 11-20 MIN: CPT | Performed by: NURSE PRACTITIONER

## 2021-01-26 RX ORDER — DEXTROAMPHETAMINE SACCHARATE, AMPHETAMINE ASPARTATE, DEXTROAMPHETAMINE SULFATE AND AMPHETAMINE SULFATE 2.5; 2.5; 2.5; 2.5 MG/1; MG/1; MG/1; MG/1
10 TABLET ORAL
Qty: 60 TAB | Refills: 0 | Status: SHIPPED | OUTPATIENT
Start: 2021-01-26 | End: 2021-01-26 | Stop reason: SDUPTHER

## 2021-01-26 RX ORDER — DEXTROAMPHETAMINE SACCHARATE, AMPHETAMINE ASPARTATE, DEXTROAMPHETAMINE SULFATE AND AMPHETAMINE SULFATE 2.5; 2.5; 2.5; 2.5 MG/1; MG/1; MG/1; MG/1
10 TABLET ORAL
Qty: 60 TAB | Refills: 0 | Status: SHIPPED | OUTPATIENT
Start: 2021-03-26 | End: 2021-05-03 | Stop reason: SDUPTHER

## 2021-01-26 RX ORDER — DEXTROAMPHETAMINE SACCHARATE, AMPHETAMINE ASPARTATE, DEXTROAMPHETAMINE SULFATE AND AMPHETAMINE SULFATE 2.5; 2.5; 2.5; 2.5 MG/1; MG/1; MG/1; MG/1
10 TABLET ORAL
Qty: 60 TAB | Refills: 0 | Status: SHIPPED | OUTPATIENT
Start: 2021-02-26 | End: 2021-01-26 | Stop reason: SDUPTHER

## 2021-01-26 NOTE — PROGRESS NOTES
Assessment/Plan:     Diagnoses and all orders for this visit:    1. Attention deficit disorder (ADD) without hyperactivity  -     dextroamphetamine-amphetamine (ADDERALL) 10 mg tablet; Take 1 Tab by mouth two (2) times daily as needed (for concentration/focus). Max Daily Amount: 20 mg. Indications: attention deficit disorder with hyperactivity    Length of supply given today: 3 months  Last Urine Drug Screen: up to date  Change to Dosage: stable on current therapy   Reviewed: Yes          Discussed expected course/resolution/complications of diagnosis in detail with patient. Medication risks/benefits/costs/interactions/alternatives discussed with patient. Pt was given after visit summary which includes diagnoses, current medications & vitals. Pt expressed understanding with the diagnosis and plan    The patient has consented for synchronous (real-time) Telemedicine (audio technology) on 1/26/21 for their care to be delivered over telemedicine in place of their regularly scheduled office visit pursuant to the emergency declaration under the [facility], Critical access hospital waiver authority and the [employer] and [employer] Act, this Virtual  Visit was conducted by the practitioner who is located in the medical office in [place], [place], with patient's consent, to reduce the patient's risk of exposure to COVID-19 and provide continuity of care. Visit Length: 11-20 minutes. Subjective:      Carola Gunter is a 41 y.o. male who presents for had concerns including Medication Refill. ADHD   Carola Gunter is compliant with treatment regimen. Patient denies chest pain, shortness of breath, weight loss, insomnia, or difficulty with appetite. Symptoms are currently well controlled. Patient denies personal or family history of drug abuse. Previous neuropsychiatric evaluation is on file.  Controlled substance contract is on file.    Patient Active Problem List   Diagnosis Code    ADD (attention deficit disorder) F98.8    Sleep disturbance G47.9    Chronic allergic rhinitis J30.9    Hypertriglyceridemia E78.1    FHx: colon cancer Z80.0    FHx: melanoma Z80.8       Current Outpatient Medications   Medication Sig Dispense Refill    [START ON 3/26/2021] dextroamphetamine-amphetamine (ADDERALL) 10 mg tablet Take 1 Tab by mouth two (2) times daily as needed (for concentration/focus). Max Daily Amount: 20 mg. Indications: attention deficit disorder with hyperactivity 60 Tab 0    chlorphen-pseudoeph-ibuprofen (Advil Allergy Sinus) 2- mg tab Take  by mouth. No Known Allergies    ROS:   Review of Systems   Constitutional: Negative for malaise/fatigue. Eyes: Negative for blurred vision. Respiratory: Negative for shortness of breath. Cardiovascular: Negative for chest pain. Objective: There were no vitals taken for this visit. Vitals and Nurse Documentation reviewed. Physical Exam  Neurological:      Mental Status: He is alert.    Psychiatric:         Mood and Affect: Mood normal.         Behavior: Behavior normal.

## 2021-05-03 ENCOUNTER — VIRTUAL VISIT (OUTPATIENT)
Dept: FAMILY MEDICINE CLINIC | Age: 41
End: 2021-05-03
Payer: COMMERCIAL

## 2021-05-03 DIAGNOSIS — F98.8 ATTENTION DEFICIT DISORDER (ADD) WITHOUT HYPERACTIVITY: ICD-10-CM

## 2021-05-03 PROCEDURE — 99213 OFFICE O/P EST LOW 20 MIN: CPT | Performed by: NURSE PRACTITIONER

## 2021-05-03 RX ORDER — DEXTROAMPHETAMINE SACCHARATE, AMPHETAMINE ASPARTATE, DEXTROAMPHETAMINE SULFATE AND AMPHETAMINE SULFATE 2.5; 2.5; 2.5; 2.5 MG/1; MG/1; MG/1; MG/1
10 TABLET ORAL
Qty: 60 TAB | Refills: 0 | Status: SHIPPED | OUTPATIENT
Start: 2021-05-03 | End: 2021-05-03 | Stop reason: SDUPTHER

## 2021-05-03 RX ORDER — DEXTROAMPHETAMINE SACCHARATE, AMPHETAMINE ASPARTATE, DEXTROAMPHETAMINE SULFATE AND AMPHETAMINE SULFATE 2.5; 2.5; 2.5; 2.5 MG/1; MG/1; MG/1; MG/1
10 TABLET ORAL
Qty: 60 TAB | Refills: 0 | Status: SHIPPED | OUTPATIENT
Start: 2021-07-03 | End: 2021-08-16 | Stop reason: SDUPTHER

## 2021-05-03 RX ORDER — DEXTROAMPHETAMINE SACCHARATE, AMPHETAMINE ASPARTATE, DEXTROAMPHETAMINE SULFATE AND AMPHETAMINE SULFATE 2.5; 2.5; 2.5; 2.5 MG/1; MG/1; MG/1; MG/1
10 TABLET ORAL
Qty: 60 TAB | Refills: 0 | Status: SHIPPED | OUTPATIENT
Start: 2021-06-03 | End: 2021-05-03 | Stop reason: SDUPTHER

## 2021-05-03 NOTE — PROGRESS NOTES
Assessment/Plan:     Diagnoses and all orders for this visit:    1. Attention deficit disorder (ADD) without hyperactivity  -     dextroamphetamine-amphetamine (ADDERALL) 10 mg tablet; Take 1 Tab by mouth two (2) times daily as needed (for concentration/focus). Max Daily Amount: 20 mg. Indications: attention deficit disorder with hyperactivity     Length of supply given today: 3 months  Last Urine Drug Screen: up to date  Change to Dosage: no, stable on current therapy.  Reviewed: Yes      Follow-up and Dispositions    · Return in about 3 months (around 8/3/2021) for Follow Up. Discussed expected course/resolution/complications of diagnosis in detail with patient. Medication risks/benefits/costs/interactions/alternatives discussed with patient. Pt was given after visit summary which includes diagnoses, current medications & vitals. Pt expressed understanding with the diagnosis and plan    The patient has consented for synchronous (real-time) Telemedicine (audio-video technology) on 5/3/2021 for their care to be delivered over telemedicine in place of their regularly scheduled office visit pursuant to the emergency declaration under the [address] and the [address] authority and the [employer] and [employer] Act, this Virtual  Visit was conducted by the practitioner who is located in the medical office in [place], [place], with patient's consent, to reduce the patient's risk of exposure to COVID-19 and provide continuity of care. Visit Length: 20 to 29 minutes. Subjective:      Vale Valdez is a 41 y.o. male who presents for had concerns including Medication Refill. ADHD   Vale Valdez is compliant with treatment regimen. Patient denies chest pain, shortness of breath, weight loss, insomnia, or difficulty with appetite. Symptoms are currently well controlled.   Patient denies personal or family history of drug abuse. Previous neuropsychiatric evaluation is on file. Controlled substance contract is on file. Patient Active Problem List   Diagnosis Code    ADD (attention deficit disorder) F98.8    Sleep disturbance G47.9    Chronic allergic rhinitis J30.9    Hypertriglyceridemia E78.1    FHx: colon cancer Z80.0    FHx: melanoma Z80.8       Current Outpatient Medications   Medication Sig Dispense Refill    [START ON 7/3/2021] dextroamphetamine-amphetamine (ADDERALL) 10 mg tablet Take 1 Tab by mouth two (2) times daily as needed (for concentration/focus). Max Daily Amount: 20 mg. Indications: attention deficit disorder with hyperactivity 60 Tab 0    chlorphen-pseudoeph-ibuprofen (Advil Allergy Sinus) 2- mg tab Take  by mouth. No Known Allergies    ROS:   Review of Systems   Constitutional: Negative for malaise/fatigue. Eyes: Negative for blurred vision. Respiratory: Negative for shortness of breath. Cardiovascular: Negative for chest pain. Objective: There were no vitals taken for this visit. Vitals and Nurse Documentation reviewed. Physical Exam  Constitutional:       Appearance: Normal appearance. Neurological:      Mental Status: He is alert.    Psychiatric:         Attention and Perception: Attention normal.         Mood and Affect: Mood normal.         Speech: Speech normal.         Behavior: Behavior normal.         Cognition and Memory: Cognition normal.

## 2021-05-04 ENCOUNTER — TELEPHONE (OUTPATIENT)
Dept: FAMILY MEDICINE CLINIC | Age: 41
End: 2021-05-04

## 2021-05-04 NOTE — TELEPHONE ENCOUNTER
Chief Complaint   Patient presents with    Prior Auth     Amphetamine-Dextroamphetamine 10MG tablets:  APPROVED:  Your PA request has been approved. Additional information will be provided in the approval communication. [facility] pharmacy was faxed approval notification at [phone] with confirmation received.   Yoni Cornelius LPN

## 2021-08-16 DIAGNOSIS — F98.8 ATTENTION DEFICIT DISORDER (ADD) WITHOUT HYPERACTIVITY: ICD-10-CM

## 2021-08-16 NOTE — TELEPHONE ENCOUNTER
Last visit 05/03/2021 Virtual visit RUDI Perrin   Next appointment 09/13/2021 RUDI Perrin   Previous refill encounter(s)   07/03/2021 Adderall #[id number]     No access to      Requested Prescriptions     Pending Prescriptions Disp Refills    dextroamphetamine-amphetamine (ADDERALL) 10 mg tablet 60 Tablet 0     Sig: Take 1 Tablet by mouth two (2) times daily as needed (for concentration/focus). Max Daily Amount: 20 mg.  Indications: attention deficit disorder with hyperactivity

## 2021-08-17 RX ORDER — DEXTROAMPHETAMINE SACCHARATE, AMPHETAMINE ASPARTATE, DEXTROAMPHETAMINE SULFATE AND AMPHETAMINE SULFATE 2.5; 2.5; 2.5; 2.5 MG/1; MG/1; MG/1; MG/1
10 TABLET ORAL
Qty: 60 TABLET | Refills: 0 | Status: SHIPPED | OUTPATIENT
Start: 2021-08-17 | End: 2021-09-13 | Stop reason: SDUPTHER

## 2021-09-13 ENCOUNTER — VIRTUAL VISIT (OUTPATIENT)
Dept: FAMILY MEDICINE CLINIC | Age: 41
End: 2021-09-13
Payer: COMMERCIAL

## 2021-09-13 DIAGNOSIS — F98.8 ATTENTION DEFICIT DISORDER (ADD) WITHOUT HYPERACTIVITY: ICD-10-CM

## 2021-09-13 PROCEDURE — 99213 OFFICE O/P EST LOW 20 MIN: CPT | Performed by: NURSE PRACTITIONER

## 2021-09-13 RX ORDER — DEXTROAMPHETAMINE SACCHARATE, AMPHETAMINE ASPARTATE, DEXTROAMPHETAMINE SULFATE AND AMPHETAMINE SULFATE 2.5; 2.5; 2.5; 2.5 MG/1; MG/1; MG/1; MG/1
10 TABLET ORAL
Qty: 60 TABLET | Refills: 0 | Status: SHIPPED | OUTPATIENT
Start: 2021-09-13 | End: 2021-09-13 | Stop reason: SDUPTHER

## 2021-09-13 RX ORDER — DEXTROAMPHETAMINE SACCHARATE, AMPHETAMINE ASPARTATE, DEXTROAMPHETAMINE SULFATE AND AMPHETAMINE SULFATE 2.5; 2.5; 2.5; 2.5 MG/1; MG/1; MG/1; MG/1
10 TABLET ORAL
Qty: 60 TABLET | Refills: 0 | Status: SHIPPED | OUTPATIENT
Start: 2021-11-13

## 2021-09-13 RX ORDER — DEXTROAMPHETAMINE SACCHARATE, AMPHETAMINE ASPARTATE, DEXTROAMPHETAMINE SULFATE AND AMPHETAMINE SULFATE 2.5; 2.5; 2.5; 2.5 MG/1; MG/1; MG/1; MG/1
10 TABLET ORAL
Qty: 60 TABLET | Refills: 0 | Status: SHIPPED | OUTPATIENT
Start: 2021-10-13 | End: 2021-09-13 | Stop reason: SDUPTHER

## 2021-09-13 NOTE — PROGRESS NOTES
Assessment/Plan:     Diagnoses and all orders for this visit:    1. Attention deficit disorder (ADD) without hyperactivity  -     dextroamphetamine-amphetamine (ADDERALL) 10 mg tablet; Take 1 Tablet by mouth two (2) times daily as needed (for concentration/focus). Max Daily Amount: 20 mg. Indications: attention deficit disorder with hyperactivity       He was previously referred for neuropsychiatric evaluation. Given three months today.  reviewed and appropriate. No changes. He will have an in person evaluation with labs in three months. Will update his controlled substance contract at that time. The patient has consented for synchronous (real-time) Telemedicine (audio-video technology) on 9/13/21 for their care to be delivered over telemedicine in place of their regularly scheduled office visit pursuant to the emergency declaration under the [facility], [facility] waiver authority and the [employer] and [employer] Act, this Virtual  Visit was conducted by the practitioner who is located in the medical office in [place], [place], with patient's consent, to reduce the patient's risk of exposure to COVID-19 and provide continuity of care. Visit Length: 20-29 minutes. Follow-up and Dispositions    · Return in about 3 months (around 12/13/2021) for Complete Physical.         Discussed expected course/resolution/complications of diagnosis in detail with patient. Medication risks/benefits/costs/interactions/alternatives discussed with patient. Pt was given after visit summary which includes diagnoses, current medications & vitals. Pt expressed understanding with the diagnosis and plan          Subjective:      Javid Winters is a 35 y.o. male who presents for had no chief complaint listed for this encounter. ADHD   Javid Winters is compliant with treatment regimen.   Patient denies chest pain, shortness of breath, weight loss, insomnia, or difficulty with appetite. Symptoms are currently well controlled. Patient denies personal or family history of drug abuse. Previous neuropsychiatric evaluation is not on file. Controlled substance contract is not on file.       Patient Active Problem List   Diagnosis Code    ADD (attention deficit disorder) F98.8    Sleep disturbance G47.9    Chronic allergic rhinitis J30.9    Hypertriglyceridemia E78.1    FHx: colon cancer Z80.0    FHx: melanoma Z80.8       Current Outpatient Medications   Medication Sig Dispense Refill    [START ON 11/13/2021] dextroamphetamine-amphetamine (ADDERALL) 10 mg tablet Take 1 Tablet by mouth two (2) times daily as needed (for concentration/focus). Max Daily Amount: 20 mg. Indications: attention deficit disorder with hyperactivity 60 Tablet 0    chlorphen-pseudoeph-ibuprofen (Advil Allergy Sinus) 2- mg tab Take  by mouth. No Known Allergies    ROS:   Review of Systems   Constitutional: Negative for malaise/fatigue. Eyes: Negative for blurred vision. Respiratory: Negative for shortness of breath. Cardiovascular: Negative for chest pain. Objective: There were no vitals taken for this visit. Vitals and Nurse Documentation reviewed. Physical Exam  Constitutional:       Appearance: Normal appearance. Neurological:      Mental Status: He is alert.    Psychiatric:         Attention and Perception: Attention normal.         Mood and Affect: Mood normal.         Speech: Speech normal.         Behavior: Behavior normal.         Cognition and Memory: Cognition normal.

## 2021-11-27 NOTE — LETTER
12/28/2021    Mr. Arnold Ganser  [id number]  [place] [address]      Dear Arnold Ganser:    Please find your most recent results below. Resulted Orders   AMPHETAMINE, CONFIRM   Result Value Ref Range    Amphetamines Positive (A) Dgdtyz=2258    Amphetamines Positive (A)     Amphetamine confirm 1,216 Rsomwu=864 ng/mL    Methamphetamine Negative Eezgty=566   CBC WITH AUTOMATED DIFF   Result Value Ref Range    WBC 6.8 3.4 - 10.8 x10E3/uL    RBC 4.60 4.14 - 5.80 x10E6/uL    HGB 15.1 13.0 - 17.7 g/dL    HCT 44.1 37.5 - 51.0 %    MCV 96 79 - 97 fL    MCH 32.8 26.6 - 33.0 pg    MCHC 34.2 31.5 - 35.7 g/dL    RDW 11.6 11.6 - 15.4 %    PLATELET 153 608 - 618 x10E3/uL    NEUTROPHILS 50 Not Estab. %    Lymphocytes 32 Not Estab. %    MONOCYTES 10 Not Estab. %    EOSINOPHILS 7 Not Estab. %    BASOPHILS 1 Not Estab. %    ABS. NEUTROPHILS 3.5 1.4 - 7.0 x10E3/uL    Abs Lymphocytes 2.1 0.7 - 3.1 x10E3/uL    ABS. MONOCYTES 0.6 0.1 - 0.9 x10E3/uL    ABS. EOSINOPHILS 0.5 (H) 0.0 - 0.4 x10E3/uL    ABS. BASOPHILS 0.1 0.0 - 0.2 x10E3/uL    IMMATURE GRANULOCYTES 0 Not Estab. %    ABS. IMM. GRANS. 0.0 0.0 - 0.1 [id number]   METABOLIC PANEL, COMPREHENSIVE   Result Value Ref Range    Glucose 100 (H) 65 - 99 mg/dL    BUN 12 6 - 24 mg/dL    Creatinine 1.08 0.76 - 1.27 mg/dL    GFR est non-AA 84 >59 mL/min/1.73    GFR est AA 97 >59 mL/min/1.73    BUN/Creatinine ratio 11 9 - 20    Sodium 140 134 - 144 mmol/L    Potassium 4.3 3.5 - 5.2 mmol/L    Chloride 103 96 - 106 mmol/L    CO2 24 20 - 29 mmol/L    Calcium 9.1 8.7 - 10.2 mg/dL    Protein, total 7.1 6.0 - 8.5 g/dL    Albumin 4.8 4.0 - 5.0 g/dL    GLOBULIN, TOTAL 2.3 1.5 - 4.5 g/dL    A-G Ratio 2.1 1.2 - 2.2    Bilirubin, total 0.3 0.0 - 1.2 mg/dL    Alk.  phosphatase 46 44 - 121 IU/L    AST (SGOT) 17 0 - 40 IU/L    ALT (SGPT) 23 0 - 44 IU/L   LIPID PANEL   Result Value Ref Range    Cholesterol, total 248 (H) 100 - 199 mg/dL    Triglyceride 131 0 - 149 mg/dL    HDL Cholesterol 47 >39 mg/dL    VLDL, calculated 24 5 - 40 mg/dL    LDL, calculated 177 (H) 0 - 99 mg/dL         RECOMMENDATIONS:    Your cholesterol is elevated.  I have calculated your risk of stroke or heart attack in the next ten years.  This is called an ASCVD (atherosclerotic cardiovascular disease) score.  You are 7.4 % which is considered High Risk.  Statin therapy is typically initiated when this risk score is 7.5% or greater.  I think we are close enough to go ahead and start Atorvastatin once daily to lower your cholesterol if you are agreeable.  The rest of your labs are normal.      Please call me if you have any questions: [phone]    Sincerely,      Marii Moran NP

## 2021-11-27 NOTE — LETTER
12/28/2021    Mr. Smith Mcgill  [address] [address]      Dear Smith Mcgill:    Please find your most recent results below. Results for orders placed or performed in visit on 11/27/21   10-DRUG SCREEN W/CONF   Result Value Ref Range    Amphetamine Screen, urine See Final Results Avipae=6208 ng/mL    Barbiturates Screen, urine Negative Qmsqqw=962 ng/mL    Benzodiazepines Screen, urine Negative Hnbauw=331 ng/mL    Cannabinoid Screen, urine Negative Cutoff=20 ng/mL    Cocaine Metab. Screen, urine Negative Irxamn=495 ng/mL    Opiate Screen, urine Negative Nhkdaa=037 ng/mL    Oxycodone/Oxymorphone, urine Negative Slhtxn=694 ng/mL    Phencyclidine Screen, urine Negative Cutoff=25 ng/mL    Methadone Screen, urine Negative Prpspk=970 ng/mL    Propoxyphene Screen, urine Negative Guftut=231 ng/mL    Creatinine, urine 133.6 20.0 - 300.0 mg/dL    pH, urine 5.4 4.5 - 8.9    Please Note Comment    AMPHETAMINE, CONFIRM   Result Value Ref Range    Amphetamines Positive (A) Pkjqew=7594    Amphetamines Positive (A)     Amphetamine confirm 1,216 Lizbvw=675 ng/mL    Methamphetamine Negative Iinguj=745   CBC WITH AUTOMATED DIFF   Result Value Ref Range    WBC 6.8 3.4 - 10.8 x10E3/uL    RBC 4.60 4.14 - 5.80 x10E6/uL    HGB 15.1 13.0 - 17.7 g/dL    HCT 44.1 37.5 - 51.0 %    MCV 96 79 - 97 fL    MCH 32.8 26.6 - 33.0 pg    MCHC 34.2 31.5 - 35.7 g/dL    RDW 11.6 11.6 - 15.4 %    PLATELET 989 487 - 141 x10E3/uL    NEUTROPHILS 50 Not Estab. %    Lymphocytes 32 Not Estab. %    MONOCYTES 10 Not Estab. %    EOSINOPHILS 7 Not Estab. %    BASOPHILS 1 Not Estab. %    ABS. NEUTROPHILS 3.5 1.4 - 7.0 x10E3/uL    Abs Lymphocytes 2.1 0.7 - 3.1 x10E3/uL    ABS. MONOCYTES 0.6 0.1 - 0.9 x10E3/uL    ABS. EOSINOPHILS 0.5 (H) 0.0 - 0.4 x10E3/uL    ABS. BASOPHILS 0.1 0.0 - 0.2 x10E3/uL    IMMATURE GRANULOCYTES 0 Not Estab. %    ABS. IMM.  GRANS. 0.0 0.0 - 0.1 [id number]/PY   METABOLIC PANEL, COMPREHENSIVE   Result Value Ref Range Glucose 100 (H) 65 - 99 mg/dL    BUN 12 6 - 24 mg/dL    Creatinine 1.08 0.76 - 1.27 mg/dL    GFR est non-AA 84 >59 mL/min/1.73    GFR est AA 97 >59 mL/min/1.73    BUN/Creatinine ratio 11 9 - 20    Sodium 140 134 - 144 mmol/L    Potassium 4.3 3.5 - 5.2 mmol/L    Chloride 103 96 - 106 mmol/L    CO2 24 20 - 29 mmol/L    Calcium 9.1 8.7 - 10.2 mg/dL    Protein, total 7.1 6.0 - 8.5 g/dL    Albumin 4.8 4.0 - 5.0 g/dL    GLOBULIN, TOTAL 2.3 1.5 - 4.5 g/dL    A-G Ratio 2.1 1.2 - 2.2    Bilirubin, total 0.3 0.0 - 1.2 mg/dL    Alk. phosphatase 46 44 - 121 IU/L    AST (SGOT) 17 0 - 40 IU/L    ALT (SGPT) 23 0 - 44 IU/L   LIPID PANEL   Result Value Ref Range    Cholesterol, total 248 (H) 100 - 199 mg/dL    Triglyceride 131 0 - 149 mg/dL    HDL Cholesterol 47 >39 mg/dL    VLDL, calculated 24 5 - 40 mg/dL    LDL, calculated 177 (H) 0 - 99 mg/dL   HEPATITIS C AB   Result Value Ref Range    Hep C Virus Ab <0.1 0.0 - 0.9 s/co ratio   CVD REPORT   Result Value Ref Range    INTERPRETATION Note        RECOMMENDATIONS:  {RECOMMENDATION LIST:13705::\"None. Keep up the good work! \"}    Please call me if you have any questions: [phone]    Sincerely,      Hazel Neil NP

## 2021-12-13 ENCOUNTER — OFFICE VISIT (OUTPATIENT)
Dept: FAMILY MEDICINE CLINIC | Age: 41
End: 2021-12-13
Payer: COMMERCIAL

## 2021-12-13 VITALS
DIASTOLIC BLOOD PRESSURE: 83 MMHG | WEIGHT: 196.6 LBS | HEIGHT: 72 IN | SYSTOLIC BLOOD PRESSURE: 126 MMHG | OXYGEN SATURATION: 98 % | HEART RATE: 78 BPM | RESPIRATION RATE: 16 BRPM | TEMPERATURE: 99 F | BODY MASS INDEX: 26.63 KG/M2

## 2021-12-13 DIAGNOSIS — F90.0 ADHD, PREDOMINANTLY INATTENTIVE TYPE: Primary | ICD-10-CM

## 2021-12-13 PROCEDURE — 99213 OFFICE O/P EST LOW 20 MIN: CPT | Performed by: NURSE PRACTITIONER

## 2021-12-13 RX ORDER — DEXTROAMPHETAMINE SACCHARATE, AMPHETAMINE ASPARTATE, DEXTROAMPHETAMINE SULFATE AND AMPHETAMINE SULFATE 2.5; 2.5; 2.5; 2.5 MG/1; MG/1; MG/1; MG/1
10 TABLET ORAL 2 TIMES DAILY
Qty: 60 TABLET | Refills: 0 | Status: SHIPPED | OUTPATIENT
Start: 2022-02-11 | End: 2022-06-14 | Stop reason: ALTCHOICE

## 2021-12-13 RX ORDER — DEXTROAMPHETAMINE SACCHARATE, AMPHETAMINE ASPARTATE, DEXTROAMPHETAMINE SULFATE AND AMPHETAMINE SULFATE 2.5; 2.5; 2.5; 2.5 MG/1; MG/1; MG/1; MG/1
10 TABLET ORAL 2 TIMES DAILY
Qty: 60 TABLET | Refills: 0 | Status: SHIPPED | OUTPATIENT
Start: 2022-01-12 | End: 2022-06-14 | Stop reason: ALTCHOICE

## 2021-12-13 RX ORDER — DEXTROAMPHETAMINE SACCHARATE, AMPHETAMINE ASPARTATE, DEXTROAMPHETAMINE SULFATE AND AMPHETAMINE SULFATE 2.5; 2.5; 2.5; 2.5 MG/1; MG/1; MG/1; MG/1
10 TABLET ORAL 2 TIMES DAILY
Qty: 60 TABLET | Refills: 0 | Status: SHIPPED | OUTPATIENT
Start: 2021-12-13 | End: 2022-06-14 | Stop reason: ALTCHOICE

## 2021-12-13 NOTE — PROGRESS NOTES
Assessment/Plan:     Diagnoses and all orders for this visit:    1. ADHD, predominantly inattentive type  -     dextroamphetamine-amphetamine (ADDERALL) 10 mg tablet; Take 1 Tablet by mouth two (2) times a day for 29 days. Max Daily Amount: 20 mg.  -     dextroamphetamine-amphetamine (ADDERALL) 10 mg tablet; Take 1 Tablet by mouth two (2) times a day for 29 days. Max Daily Amount: 20 mg.  -     dextroamphetamine-amphetamine (ADDERALL) 10 mg tablet; Take 1 Tablet by mouth two (2) times a day for 29 days. Max Daily Amount: 20 mg.          Length of supply given today: 3 months  Last Urine Drug Screen: recent completed and scanned  Change to Dosage: no, stable on current therapy.  Reviewed: Yes  Controlled Substance Contract updated today. Discussed expected course/resolution/complications of diagnosis in detail with patient. Medication risks/benefits/costs/interactions/alternatives discussed with patient. Pt was given after visit summary which includes diagnoses, current medications & vitals. Pt expressed understanding with the diagnosis and plan          Subjective:      Tatyana Gardiner is a 35 y.o. male who presents for had concerns including Behavioral Problem. ADHD   Tatyana Gardiner is compliant with treatment regimen. Patient denies chest pain, shortness of breath, weight loss, insomnia, or difficulty with appetite. Symptoms are currently well controlled. Patient denies personal or family history of drug abuse. Previous neuropsychiatric evaluation is on file. Controlled substance contract is not on file.             Patient Active Problem List   Diagnosis Code    ADD (attention deficit disorder) F98.8    Sleep disturbance G47.9    Chronic allergic rhinitis J30.9    Hypertriglyceridemia E78.1    FHx: colon cancer Z80.0    FHx: melanoma Z80.8       Current Outpatient Medications   Medication Sig Dispense Refill    dextroamphetamine-amphetamine (ADDERALL) 10 mg tablet Take 1 Tablet by mouth two (2) times a day for 29 days. Max Daily Amount: 20 mg. 60 Tablet 0    [START ON 1/12/2022] dextroamphetamine-amphetamine (ADDERALL) 10 mg tablet Take 1 Tablet by mouth two (2) times a day for 29 days. Max Daily Amount: 20 mg. 60 Tablet 0    [START ON 2/11/2022] dextroamphetamine-amphetamine (ADDERALL) 10 mg tablet Take 1 Tablet by mouth two (2) times a day for 29 days. Max Daily Amount: 20 mg. 60 Tablet 0    dextroamphetamine-amphetamine (ADDERALL) 10 mg tablet Take 1 Tablet by mouth two (2) times daily as needed (for concentration/focus). Max Daily Amount: 20 mg. Indications: attention deficit disorder with hyperactivity 60 Tablet 0    chlorphen-pseudoeph-ibuprofen (Advil Allergy Sinus) 2- mg tab Take  by mouth. No Known Allergies    ROS:   Review of Systems   Constitutional: Negative for malaise/fatigue. Eyes: Negative for blurred vision. Respiratory: Negative for shortness of breath. Cardiovascular: Negative for chest pain. Objective:     Visit Vitals  /83 (BP 1 Location: Left upper arm, BP Patient Position: Sitting, BP Cuff Size: Large adult long)   Pulse 78   Temp 99 °F (37.2 °C) (Temporal)   Resp 16   Ht 6' (1.829 m)   Wt 196 lb 9.6 oz (89.2 kg)   SpO2 98%   BMI 26.66 kg/m²       Vitals and Nurse Documentation reviewed. Physical Exam  Constitutional:       General: He is not in acute distress. Cardiovascular:      Heart sounds: S1 normal and S2 normal. No murmur heard. No friction rub. No gallop. Pulmonary:      Effort: No respiratory distress. Breath sounds: Normal breath sounds. Skin:     General: Skin is warm and dry.    Psychiatric:         Mood and Affect: Mood and affect normal.

## 2021-12-13 NOTE — LETTER
Name:Nancie Hurley  :1980   MR #:[id number]   [address]   Page 1 of 5        CONTROLLED SUBSTANCE AGREEMENT     I may be prescribed medications that are controlled substances as part  of my treatment plan for management of my medical condition(s). The goal of my treatment plan is to maintain and/or improve my health and wellbeing. Because controlled substances have an increased risk of abuse or harm, continual re-evaluation is needed determine if the goals of my treatment plan are being met for my safety and the safety of others. I, Merrilyn Councilman  am entering into this Controlled Substance Agreement with my provider, __________________________________ at [facility] . I understand that successful treatment requires mutual trust and honesty between me and my provider. I understand that there are state and federal laws and regulations which apply to the medications that my provider may prescribe that must be followed. I understand there are risks and benefits ts of taking the medicines that my provider may prescribe. I understand and agree that following this Agreement is necessary in continuing my provider-patient relationship and success of my treatment plan. As a part of my treatment plan, I agree to the following:    COMMUNICATION:    1. I will communicate fully with my provider about my medical condition(s), including the effect on my daily life and how well my medications are helping. I will tell my provider all of the medications that I take for any reason, including medications I receive from another health care provider, and will notify my provider about all issues, problems or concerns, including any side effects, which may be related to my medications. I understand that this information allows my provider to adjust my treatment plan to help manage my medical condition.  I understand that this information will become part of my permanent medical record. 2. I will notify my provider if I have a history of alcohol/drug misuse/addiction or if I have had treatment for alcohol/drug addiction in the past, or if I have a new problem with or concern about alcohol/drug use/addiction, because this increases the likelihood of high risk behaviors and may lead to serious medical conditions. 3. Females Only: I will notify my provider if I am or become pregnant, or if I intend to become pregnant, or if I intend to breastfeed. I understand that communication of these issues with my provider is important, due to possible effects my medication could have on an unborn fetus or breastfeeding child. Initials_____      Name:.Gilberto Hoang   :1980   MR #:[id number]   Office:[facility]   Page 2 of 5       MISUSE OF MEDICATIONS / DRUGS:    1. I agree to take all controlled substances as prescribed, and will not misuse or abuse any controlled substances prescribed by my provider. For my safety, I will not increase the amount of medicine I take without first talking with and getting permission from my provider. 2. If I have a medical emergency, another health care provider may prescribe me medication. If I seek emergency treatment, I will notify my provider within seventy-two (72) hours. 3. I understand that my provider may discuss my use and/or possible misuse/abuse of controlled substances and alcohol, as appropriate, with any health care provider involved in my care, pharmacist or legal authority. ILLEGAL DRUGS:    1. I will not use illegal drugs of any kind, including but not limited to marijuana, heroin, cocaine, or any prescription drug which is not prescribed to me. DRUG DIVERSION / PRESCRIPTION FRAUD:    1. I will not share, sell, trade, give away, or otherwise misuse my prescriptions or medications. 2. I will not alter any prescriptions provided to me by my provider.     SINGLE PROVIDER:    1. I agree that all controlled substances that I take will be prescribed only by my provider (or his/her covering provider) under this Agreement. This agreement does not prevent me from seeking emergency medical treatment or receiving pain management related to a surgery. PROTECTING MEDICATIONS:    1. I am responsible for keeping my prescriptions and medications in a safe and secure place including safeguarding them from loss or theft. I understand that lost, stolen or damaged/destroyed prescriptions or medications will not be replaced. Initials____          Name:.Gilberto English   :1980   MR #:[id number]   Office:[facility]   Page 3 of 5   PRESCRIPTION RENEWALS/REFILLS:    1. I will follow my controlled substance medication schedule as prescribed by my provider. 2. I understand and agree that I will make any requests for renewals or refills of my prescriptions only at the time of an office visit or during my providers regular office hours subject to the prescription refill requirements of the individual practice. 3. I understand that my provider may not call in prescriptions for controlled substances to my pharmacy. 4. I understand that my provider may adjust or discontinue these medications as deemed appropriate for my medical treatment plan. This Agreement does not guarantee the prescription of controlled medications. 5. I agree that if my medications are adjusted or discontinued, I will properly dispose of any remaining medications. I understand that I will be required to dispose of any remaining controlled medications prior to being provided with any prescriptions for other controlled medications. 6. I understand that the renewal of my prescription depends on my medical condition, my consistent participation, and my adherence with my treatment plan and this Agreement.     7. I understand that if I do not keep an appointment with my provider, I may not receive a renewal or refill for my controlled substance medication. PRESCRIPTION MONITORING / DRUG TESTIN. I understand that my provider may require me to provide urine, saliva or blood for testing at any time. I understand that this testing will be used to monitor for safety and adherence with my treatment plan and this Agreement. 2. I understand that my provider may ask me to provide an observed urine specimen, which means that a nurse or other health care provider may watch me provide urine, and I agree to cooperate if I am asked to provide an observed specimen. 3. I understand that if I do not provide urine, saliva or blood samples within two (2) hours of my providers request, or other timeframe decided by my provider, my treatment plan could be changed, or my prescriptions and medications may be changed or ended. 4. I understand that urine, saliva and blood test results will be a part of my permanent medical record. Initials_____        Name:.Gilberto Grier   :1980   MR #:[id number]   [address]   Page 4 of 5    5. I understand that my provider is required to obtain a copy of my State Prescription Monitoring Program () Report at any time in order to safely prescribe medications. 6. I will bring all of my prescribed controlled substance medications in their original bottles to all of my scheduled appointments. 7. I understand that my provider may ask me to come to the practice with all of my prescribed medications for a random pill count at any time. I agree to cooperate if I am asked to come in for a random pill count. I understand that if I do not arrive in the timeframe decided by my provider, my treatment plan could be changed, or my prescriptions and medications may be changed or ended.     COOPERATION WITH INVESTIGATIONS:    1. I authorize my provider and my pharmacy to cooperate fully with any local, state, or federal law enforcement agency in the investigation of any possible misuse, sale, or other diversion of my controlled substance prescriptions or medications. RISKS:    1. I understand that my level of consciousness may be affected from the use of controlled substances, and I understand that there are risks, benefits, effects and potential alternatives (including no treatment) to the medications that my provider has prescribed. 2. I understand that I may become drowsy, tired, dizzy, constipated, and sick to my stomach, or have changes in my mood or in my sleep while taking my medications. I have talked with my provider about these possible side effects, risks, benefits, and alternative treatments, and my provider has answered all of my questions. 3. I understand that I should not suddenly stop taking my medications without first speaking with my provider. I understand that if I suddenly stop taking my medications, I may experience nausea, vomiting, sweating,anxiety, sleeplessness, itching or other uncomfortable feelings. 4. I will not take my medications with alcohol of any kind, including beer, wine or liquor. I understand that drinking alcohol with my medications increases the chances of side effects, including breathing problems or even death. 5. I understand that if I have a history of alcoholism or other drug addiction I may be at increased risk of addiction to my medications. Signs of addiction might include craving, compulsive use, and continued use despite harm. Since addiction is a disease, I understand my provider may decide to change my medications and refer me to appropriate treatment services. I understand that this information would become part of my permanent medical record. Initials_____        Name:Nancie Rogers   :1980   MR #:[id number]   [address]   Page 5 of 5       6.  Females only: Children born to women who regularly take controlled substances are likely to have physical problems and suffer withdrawal symptoms at birth. If I am of child-bearing age, I understand that I should use safe and effective birth control while taking any controlled substances to avoid the impact of medications on an unborn fetus or  child. I agree to notify my provider immediately if I should become pregnant so that my treatment plan can be adjusted. 7. Males only: I understand that chronic use of controlled substances has been associated with low testosterone levels in males which may affect my mood, stamina, sexual desire, and general health. I understand that my provider may order the appropriate laboratory test to determine my testosterone level,and I agree to this testing. ADHERENCE:    1. I understand that if I do not adhere to this Agreement in any way, my provider may change my prescriptions, stop prescribing controlled substances or end our provider-patient relationship. 2. If my provider decides to stop prescribing medication, or decides to end our provider-patient relationship,my provider may require that I taper my medications slowly. If necessary, my provider may also provide a prescription for other medications to treat my withdrawal symptoms. UNDERSTANDING THIS AGREEMENT:    I understand that my provider may adjust or stop my prescriptions for controlled substances based on my medical condition and my treatment plan. I understand that this Agreement does not guarantee that I will be prescribed medications or controlled substances. I understand that controlled substances may be just one part  of my treatment plan. My initial on each page and my signature below shows that I have read each page of this Agreement, I have had an opportunity to ask questions, and all of my questions have been answered to my satisfaction by my provider.     By signing below, I agree to comply with this Agreement, and I understand that if I do not follow the Agreements listed above, my provider may stop    _________________________________________  Date/Time 12/13/2021 12:15 PM                 (Patient Signature)    ________________________________________    Date/Time 12/13/2021 12:15 PM   (Parent or Guardian Signature if <18 yrs)    _________________________________________ Date/Time 12/13/2021 12:15 PM   (Provider Signature)

## 2021-12-13 NOTE — PROGRESS NOTES
Chief Complaint   Patient presents with    Behavioral Problem       1. \"Have you been to the ER, urgent care clinic since your last visit? Hospitalized since your last visit? \" No    2. \"Have you seen or consulted any other health care providers outside of the [address] [place] [address] since your last visit? \" No     3. For patients over 45: Has the patient had a colonoscopy?  Yes, HM satisfied with blue hyperlink         3 most recent PHQ Screens 12/13/2021   Little interest or pleasure in doing things Not at all   Feeling down, depressed, irritable, or hopeless Not at all   Total Score PHQ 2 0     Health Maintenance Due   Topic Date Due    Hepatitis C Screening  Never done    COVID-19 Vaccine (1) Never done    Flu Vaccine (1) Never done

## 2021-12-21 LAB
ALBUMIN SERPL-MCNC: 4.8 G/DL (ref 4–5)
ALBUMIN/GLOB SERPL: 2.1 {RATIO} (ref 1.2–2.2)
ALP SERPL-CCNC: 46 IU/L (ref 44–121)
ALT SERPL-CCNC: 23 IU/L (ref 0–44)
AMPHET UR CFM-MCNC: 1216 NG/ML
AMPHET UR QL CFM: POSITIVE
AMPHETAMINES UR QL SCN: NORMAL NG/ML
AMPHETAMINES UR QL: POSITIVE
AST SERPL-CCNC: 17 IU/L (ref 0–40)
BARBITURATES UR QL SCN: NEGATIVE NG/ML
BASOPHILS # BLD AUTO: 0.1 X10E3/UL (ref 0–0.2)
BASOPHILS NFR BLD AUTO: 1 %
BENZODIAZ UR QL SCN: NEGATIVE NG/ML
BILIRUB SERPL-MCNC: 0.3 MG/DL (ref 0–1.2)
BUN SERPL-MCNC: 12 MG/DL (ref 6–24)
BUN/CREAT SERPL: 11 (ref 9–20)
BZE UR QL SCN: NEGATIVE NG/ML
CALCIUM SERPL-MCNC: 9.1 MG/DL (ref 8.7–10.2)
CANNABINOIDS UR QL SCN: NEGATIVE NG/ML
CHLORIDE SERPL-SCNC: 103 MMOL/L (ref 96–106)
CHOLEST SERPL-MCNC: 248 MG/DL (ref 100–199)
CO2 SERPL-SCNC: 24 MMOL/L (ref 20–29)
CREAT SERPL-MCNC: 1.08 MG/DL (ref 0.76–1.27)
CREAT UR-MCNC: 133.6 MG/DL (ref 20–300)
EOSINOPHIL # BLD AUTO: 0.5 X10E3/UL (ref 0–0.4)
EOSINOPHIL NFR BLD AUTO: 7 %
ERYTHROCYTE [DISTWIDTH] IN BLOOD BY AUTOMATED COUNT: 11.6 % (ref 11.6–15.4)
GLOBULIN SER CALC-MCNC: 2.3 G/DL (ref 1.5–4.5)
GLUCOSE SERPL-MCNC: 100 MG/DL (ref 65–99)
HCT VFR BLD AUTO: 44.1 % (ref 37.5–51)
HCV AB S/CO SERPL IA: <0.1 S/CO RATIO (ref 0–0.9)
HDLC SERPL-MCNC: 47 MG/DL
HGB BLD-MCNC: 15.1 G/DL (ref 13–17.7)
IMM GRANULOCYTES # BLD AUTO: 0 X10E3/UL (ref 0–0.1)
IMM GRANULOCYTES NFR BLD AUTO: 0 %
IMP & REVIEW OF LAB RESULTS: NORMAL
LDLC SERPL CALC-MCNC: 177 MG/DL (ref 0–99)
LYMPHOCYTES # BLD AUTO: 2.1 X10E3/UL (ref 0.7–3.1)
LYMPHOCYTES NFR BLD AUTO: 32 %
MCH RBC QN AUTO: 32.8 PG (ref 26.6–33)
MCHC RBC AUTO-ENTMCNC: 34.2 G/DL (ref 31.5–35.7)
MCV RBC AUTO: 96 FL (ref 79–97)
METHADONE UR QL SCN: NEGATIVE NG/ML
METHAMPHET UR QL CFM: NEGATIVE
MONOCYTES # BLD AUTO: 0.6 X10E3/UL (ref 0.1–0.9)
MONOCYTES NFR BLD AUTO: 10 %
NEUTROPHILS # BLD AUTO: 3.5 X10E3/UL (ref 1.4–7)
NEUTROPHILS NFR BLD AUTO: 50 %
OPIATES UR QL SCN: NEGATIVE NG/ML
OXYCODONE+OXYMORPHONE UR QL SCN: NEGATIVE NG/ML
PCP UR QL: NEGATIVE NG/ML
PH UR: 5.4 [PH] (ref 4.5–8.9)
PLATELET # BLD AUTO: 272 X10E3/UL (ref 150–450)
PLEASE NOTE:, 733157: NORMAL
POTASSIUM SERPL-SCNC: 4.3 MMOL/L (ref 3.5–5.2)
PROPOXYPH UR QL SCN: NEGATIVE NG/ML
PROT SERPL-MCNC: 7.1 G/DL (ref 6–8.5)
RBC # BLD AUTO: 4.6 X10E6/UL (ref 4.14–5.8)
SODIUM SERPL-SCNC: 140 MMOL/L (ref 134–144)
TRIGL SERPL-MCNC: 131 MG/DL (ref 0–149)
VLDLC SERPL CALC-MCNC: 24 MG/DL (ref 5–40)
WBC # BLD AUTO: 6.8 X10E3/UL (ref 3.4–10.8)

## 2022-03-14 ENCOUNTER — VIRTUAL VISIT (OUTPATIENT)
Dept: FAMILY MEDICINE CLINIC | Age: 42
End: 2022-03-14
Payer: COMMERCIAL

## 2022-03-14 DIAGNOSIS — F98.8 ATTENTION DEFICIT DISORDER (ADD) WITHOUT HYPERACTIVITY: Primary | ICD-10-CM

## 2022-03-14 PROCEDURE — 99213 OFFICE O/P EST LOW 20 MIN: CPT | Performed by: NURSE PRACTITIONER

## 2022-03-14 RX ORDER — DEXTROAMPHETAMINE SACCHARATE, AMPHETAMINE ASPARTATE, DEXTROAMPHETAMINE SULFATE AND AMPHETAMINE SULFATE 2.5; 2.5; 2.5; 2.5 MG/1; MG/1; MG/1; MG/1
10 TABLET ORAL 2 TIMES DAILY
Qty: 60 TABLET | Refills: 0 | Status: SHIPPED | OUTPATIENT
Start: 2022-03-14 | End: 2022-06-14 | Stop reason: ALTCHOICE

## 2022-03-14 RX ORDER — DEXTROAMPHETAMINE SACCHARATE, AMPHETAMINE ASPARTATE, DEXTROAMPHETAMINE SULFATE AND AMPHETAMINE SULFATE 2.5; 2.5; 2.5; 2.5 MG/1; MG/1; MG/1; MG/1
10 TABLET ORAL 2 TIMES DAILY
Qty: 60 TABLET | Refills: 0 | Status: SHIPPED | OUTPATIENT
Start: 2022-04-13 | End: 2022-06-14 | Stop reason: ALTCHOICE

## 2022-03-14 RX ORDER — DEXTROAMPHETAMINE SACCHARATE, AMPHETAMINE ASPARTATE, DEXTROAMPHETAMINE SULFATE AND AMPHETAMINE SULFATE 2.5; 2.5; 2.5; 2.5 MG/1; MG/1; MG/1; MG/1
10 TABLET ORAL 2 TIMES DAILY
Qty: 60 TABLET | Refills: 0 | Status: SHIPPED | OUTPATIENT
Start: 2022-05-13 | End: 2022-06-14 | Stop reason: ALTCHOICE

## 2022-03-14 NOTE — PROGRESS NOTES
Assessment/Plan:     Diagnoses and all orders for this visit:    1. Attention deficit disorder (ADD) without hyperactivity  -     dextroamphetamine-amphetamine (ADDERALL) 10 mg tablet; Take 1 Tablet by mouth two (2) times a day for 30 days. Max Daily Amount: 20 mg.  -     dextroamphetamine-amphetamine (ADDERALL) 10 mg tablet; Take 1 Tablet by mouth two (2) times a day for 30 days. Max Daily Amount: 20 mg.  -     dextroamphetamine-amphetamine (ADDERALL) 10 mg tablet; Take 1 Tablet by mouth two (2) times a day for 30 days. Max Daily Amount: 20 mg.    Length of supply given today: 3 months  Last Urine Drug Screen: up to date  Change to Dosage: no, stable on current therapy.  Reviewed: Yes     The patient has consented for synchronous (real-time) Telemedicine (audio-video technology) on 3/14/22 for their care to be delivered over telemedicine in place of their regularly scheduled office visit pursuant to the emergency declaration under the [address] [place] [address], [address] [place] [address] and the [employer] and [employer] Act, this Virtual  Visit was conducted by the practitioner who is located in the medical office in [place], [place], with patient's consent, to reduce the patient's risk of exposure to COVID-19 and provide continuity of care. Visit Length: 20-29 minutes. Follow-up and Dispositions    · Return in about 3 months (around 6/14/2022) for Follow Up. Discussed expected course/resolution/complications of diagnosis in detail with patient. Medication risks/benefits/costs/interactions/alternatives discussed with patient. Pt was given after visit summary which includes diagnoses, current medications & vitals. Pt expressed understanding with the diagnosis and plan          Subjective:      Mk Espinoza is a 35 y.o. male who presents for had concerns including Medication Evaluation.      ADHD   Mk Espinoza is compliant with treatment regimen. Patient denies chest pain, shortness of breath, weight loss, insomnia, or difficulty with appetite. Symptoms are currently well controlled. Patient denies personal or family history of drug abuse. Previous neuropsychiatric evaluation is on file. Controlled substance contract is not on file. Patient Active Problem List   Diagnosis Code    ADD (attention deficit disorder) F98.8    Sleep disturbance G47.9    Chronic allergic rhinitis J30.9    Hypertriglyceridemia E78.1    FHx: colon cancer Z80.0    FHx: melanoma Z80.8       Current Outpatient Medications   Medication Sig Dispense Refill    dextroamphetamine-amphetamine (ADDERALL) 10 mg tablet Take 1 Tablet by mouth two (2) times a day for 30 days. Max Daily Amount: 20 mg. 60 Tablet 0    [START ON 4/13/2022] dextroamphetamine-amphetamine (ADDERALL) 10 mg tablet Take 1 Tablet by mouth two (2) times a day for 30 days. Max Daily Amount: 20 mg. 60 Tablet 0    [START ON 5/13/2022] dextroamphetamine-amphetamine (ADDERALL) 10 mg tablet Take 1 Tablet by mouth two (2) times a day for 30 days. Max Daily Amount: 20 mg. 60 Tablet 0    dextroamphetamine-amphetamine (ADDERALL) 10 mg tablet Take 1 Tablet by mouth two (2) times a day for 29 days. Max Daily Amount: 20 mg. 60 Tablet 0    dextroamphetamine-amphetamine (ADDERALL) 10 mg tablet Take 1 Tablet by mouth two (2) times a day for 29 days. Max Daily Amount: 20 mg. 60 Tablet 0    dextroamphetamine-amphetamine (ADDERALL) 10 mg tablet Take 1 Tablet by mouth two (2) times a day for 29 days. Max Daily Amount: 20 mg. 60 Tablet 0    dextroamphetamine-amphetamine (ADDERALL) 10 mg tablet Take 1 Tablet by mouth two (2) times daily as needed (for concentration/focus). Max Daily Amount: 20 mg. Indications: attention deficit disorder with hyperactivity 60 Tablet 0    chlorphen-pseudoeph-ibuprofen (Advil Allergy Sinus) 2- mg tab Take  by mouth.          No Known Allergies    ROS:   Review of Systems   Constitutional: Negative for malaise/fatigue. Eyes: Negative for blurred vision. Respiratory: Negative for shortness of breath. Cardiovascular: Negative for chest pain. Objective: There were no vitals taken for this visit. Vitals and Nurse Documentation reviewed. Physical Exam  Constitutional:       Appearance: Normal appearance. Neurological:      Mental Status: He is alert.    Psychiatric:         Attention and Perception: Attention normal.         Mood and Affect: Mood normal.         Speech: Speech normal.         Behavior: Behavior normal.         Cognition and Memory: Cognition normal.

## 2022-06-14 ENCOUNTER — VIRTUAL VISIT (OUTPATIENT)
Dept: FAMILY MEDICINE CLINIC | Age: 42
End: 2022-06-14
Payer: COMMERCIAL

## 2022-06-14 DIAGNOSIS — F98.8 ATTENTION DEFICIT DISORDER (ADD) WITHOUT HYPERACTIVITY: Primary | ICD-10-CM

## 2022-06-14 PROCEDURE — 99213 OFFICE O/P EST LOW 20 MIN: CPT | Performed by: NURSE PRACTITIONER

## 2022-06-14 RX ORDER — DEXTROAMPHETAMINE SACCHARATE, AMPHETAMINE ASPARTATE, DEXTROAMPHETAMINE SULFATE AND AMPHETAMINE SULFATE 2.5; 2.5; 2.5; 2.5 MG/1; MG/1; MG/1; MG/1
10 TABLET ORAL 2 TIMES DAILY
Qty: 60 TABLET | Refills: 0 | Status: SHIPPED | OUTPATIENT
Start: 2022-06-14 | End: 2022-08-20 | Stop reason: SDUPTHER

## 2022-06-14 RX ORDER — DEXTROAMPHETAMINE SACCHARATE, AMPHETAMINE ASPARTATE, DEXTROAMPHETAMINE SULFATE AND AMPHETAMINE SULFATE 2.5; 2.5; 2.5; 2.5 MG/1; MG/1; MG/1; MG/1
10 TABLET ORAL 2 TIMES DAILY
Qty: 60 TABLET | Refills: 0 | Status: SHIPPED | OUTPATIENT
Start: 2022-08-13 | End: 2022-09-12

## 2022-06-14 RX ORDER — DEXTROAMPHETAMINE SACCHARATE, AMPHETAMINE ASPARTATE, DEXTROAMPHETAMINE SULFATE AND AMPHETAMINE SULFATE 2.5; 2.5; 2.5; 2.5 MG/1; MG/1; MG/1; MG/1
10 TABLET ORAL 2 TIMES DAILY
Qty: 60 TABLET | Refills: 0 | Status: SHIPPED | OUTPATIENT
Start: 2022-07-14 | End: 2022-08-13

## 2022-06-14 NOTE — PROGRESS NOTES
Assessment/Plan:     Diagnoses and all orders for this visit:    1. Attention deficit disorder (ADD) without hyperactivity  -     dextroamphetamine-amphetamine (ADDERALL) 10 mg tablet; Take 1 Tablet by mouth two (2) times a day for 30 days. Max Daily Amount: 20 mg.  -     dextroamphetamine-amphetamine (ADDERALL) 10 mg tablet; Take 1 Tablet by mouth two (2) times a day for 30 days. Max Daily Amount: 20 mg.  -     dextroamphetamine-amphetamine (ADDERALL) 10 mg tablet; Take 1 Tablet by mouth two (2) times a day for 30 days. Max Daily Amount: 20 mg.          Length of supply given today: 3 months  Last Urine Drug Screen: up to date  Change to Dosage: no, stable on current therapy    Reviewed: Yes    The patient has consented for synchronous (real-time) Telemedicine (audio-video technology) on 6/14/22 for their care to be delivered over telemedicine in place of their regularly scheduled office visit pursuant to the emergency declaration under the [address] and the [address] authority and the [employer] and [employer] Act, this Virtual  Visit was conducted by the practitioner who is located in the medical office in [place], [place], with patient's consent, to reduce the patient's risk of exposure to COVID-19 and provide continuity of care. Visit Length: 20-29 minutes. Discussed expected course/resolution/complications of diagnosis in detail with patient. Medication risks/benefits/costs/interactions/alternatives discussed with patient. Pt was given after visit summary which includes diagnoses, current medications & vitals. Pt expressed understanding with the diagnosis and plan          Subjective:      Diego Terry is a 38 y.o. male who presents for had concerns including Medication Refill.      ADHD   Yamini Schuster Tameka is compliant with treatment regimen.  Patient denies chest pain, shortness of breath, weight loss, insomnia, or difficulty with appetite. Symptoms are currently well controlled.  Patient denies personal or family history of drug abuse. Previous neuropsychiatric evaluation is on file. Controlled substance contract is on file. Cholesterol is elevated however he wants to recheck next appointment. Patient Active Problem List   Diagnosis Code    ADD (attention deficit disorder) F98.8    Sleep disturbance G47.9    Chronic allergic rhinitis J30.9    Hypertriglyceridemia E78.1    FHx: colon cancer Z80.0    FHx: melanoma Z80.8       Current Outpatient Medications   Medication Sig Dispense Refill    dextroamphetamine-amphetamine (ADDERALL) 10 mg tablet Take 1 Tablet by mouth two (2) times a day for 30 days. Max Daily Amount: 20 mg. 60 Tablet 0    [START ON 7/14/2022] dextroamphetamine-amphetamine (ADDERALL) 10 mg tablet Take 1 Tablet by mouth two (2) times a day for 30 days. Max Daily Amount: 20 mg. 60 Tablet 0    [START ON 8/13/2022] dextroamphetamine-amphetamine (ADDERALL) 10 mg tablet Take 1 Tablet by mouth two (2) times a day for 30 days. Max Daily Amount: 20 mg. 60 Tablet 0    dextroamphetamine-amphetamine (ADDERALL) 10 mg tablet Take 1 Tablet by mouth two (2) times daily as needed (for concentration/focus). Max Daily Amount: 20 mg. Indications: attention deficit disorder with hyperactivity 60 Tablet 0    chlorphen-pseudoeph-ibuprofen (Advil Allergy Sinus) 2- mg tab Take  by mouth. No Known Allergies    ROS:   Review of Systems   Constitutional: Negative for malaise/fatigue. Eyes: Negative for blurred vision. Respiratory: Negative for shortness of breath. Cardiovascular: Negative for chest pain. Objective: There were no vitals taken for this visit. Vitals and Nurse Documentation reviewed. Physical Exam  Constitutional:       Appearance: Normal appearance. Neurological:      Mental Status: He is alert.    Psychiatric:         Attention and Perception: Attention normal. Mood and Affect: Mood normal.         Speech: Speech normal.         Behavior: Behavior normal.         Cognition and Memory: Cognition normal.

## 2022-08-20 DIAGNOSIS — F98.8 ATTENTION DEFICIT DISORDER (ADD) WITHOUT HYPERACTIVITY: ICD-10-CM

## 2022-08-22 NOTE — TELEPHONE ENCOUNTER
NP Aydin,    Patient stating via [employer] that [facility] is out of stock/on backorder for adderall. Needs new rx sent to [facility]. Check . Vinod, Apurva    Rx [facility]: 8/13/22 #60 - Cannot fill    Last Visit: [facility] 3/14/22 RUDI Perrin  Next Appointment: 9/21/22 RUDI Perrin  Previous Refill Encounter(s): 8/13/22 #60 - Cannot fill at [facility] per patient. Requested Prescriptions     Pending Prescriptions Disp Refills    dextroamphetamine-amphetamine (ADDERALL) 10 mg tablet 60 Tablet 0     Sig: Take 1 Tablet by mouth two (2) times a day for 30 days. Max Daily Amount: 20 mg. For [facility] in place:   Recommendation Provided To:    Intervention Detail: New Rx: 1, reason: Patient Preference  Gap Closed?:   Intervention Accepted By:   Time Spent (min): 5

## 2022-08-23 RX ORDER — DEXTROAMPHETAMINE SACCHARATE, AMPHETAMINE ASPARTATE, DEXTROAMPHETAMINE SULFATE AND AMPHETAMINE SULFATE 2.5; 2.5; 2.5; 2.5 MG/1; MG/1; MG/1; MG/1
10 TABLET ORAL 2 TIMES DAILY
Qty: 60 TABLET | Refills: 0 | Status: SHIPPED | OUTPATIENT
Start: 2022-08-23 | End: 2022-09-22

## 2022-09-30 ENCOUNTER — VIRTUAL VISIT (OUTPATIENT)
Dept: FAMILY MEDICINE CLINIC | Age: 42
End: 2022-09-30
Payer: COMMERCIAL

## 2022-09-30 DIAGNOSIS — E78.2 MIXED HYPERLIPIDEMIA: ICD-10-CM

## 2022-09-30 DIAGNOSIS — F90.0 ADHD, PREDOMINANTLY INATTENTIVE TYPE: Primary | ICD-10-CM

## 2022-09-30 PROCEDURE — 99213 OFFICE O/P EST LOW 20 MIN: CPT | Performed by: NURSE PRACTITIONER

## 2022-09-30 NOTE — PROGRESS NOTES
Assessment/Plan:     Diagnoses and all orders for this visit:    1. ADHD, predominantly inattentive type  -     dextroamphetamine-amphetamine (ADDERALL) 10 mg tablet; Take 1 Tablet by mouth two (2) times a day for 30 days. Max Daily Amount: 20 mg.  -     dextroamphetamine-amphetamine (ADDERALL) 10 mg tablet; Take 1 Tablet by mouth two (2) times a day for 30 days. Max Daily Amount: 20 mg.  -     dextroamphetamine-amphetamine (ADDERALL) 10 mg tablet; Take 1 Tablet by mouth two (2) times a day for 30 days. Max Daily Amount: 20 mg.  Length of supply given today: 3 months  Last Urine Drug Screen: up to date  Change to Dosage: no, stable on current therapy   Reviewed: Yes      2. Mixed hyperlipidemia   Recent labs were reviewed and improved from prior values. No treatment required at this time. The patient has consented for synchronous (real-time) Telemedicine (audio-video technology) on 9/30/22 for their care to be delivered over telemedicine in place of their regularly scheduled office visit pursuant to the emergency declaration under the [address] [place] [address], [address] [place] [address] authority and the [employer] and [employer] Act, this Virtual  Visit was conducted by the practitioner who is located in the medical office in [place], [place], with patient's consent, to reduce the patient's risk of exposure to COVID-19 and provide continuity of care. Visit Length: 20-29 minutes. Follow-up and Dispositions    Return in about 3 months (around 12/30/2022) for Follow Up. Discussed expected course/resolution/complications of diagnosis in detail with patient. Medication risks/benefits/costs/interactions/alternatives discussed with patient. Pt was given after visit summary which includes diagnoses, current medications & vitals.    Pt expressed understanding with the diagnosis and plan          Subjective:      Hardy Max is a 38 y.o. male who presents for had no chief complaint listed for this encounter. ADHD   Beadle Distance is compliant with treatment regimen. Patient denies chest pain, shortness of breath, weight loss, insomnia, or difficulty with appetite. Symptoms are currently well controlled. Patient denies personal or family history of drug abuse. Previous neuropsychiatric evaluation is on file. Controlled substance contract is on file. Patient Active Problem List   Diagnosis Code    ADD (attention deficit disorder) F98.8    Sleep disturbance G47.9    Chronic allergic rhinitis J30.9    Hypertriglyceridemia E78.1    FHx: colon cancer Z80.0    FHx: melanoma Z80.8       Current Outpatient Medications   Medication Sig Dispense Refill    dextroamphetamine-amphetamine (ADDERALL) 10 mg tablet Take 1 Tablet by mouth two (2) times a day for 30 days. Max Daily Amount: 20 mg. 60 Tablet 0    [START ON 11/4/2022] dextroamphetamine-amphetamine (ADDERALL) 10 mg tablet Take 1 Tablet by mouth two (2) times a day for 30 days. Max Daily Amount: 20 mg. 60 Tablet 0    [START ON 12/4/2022] dextroamphetamine-amphetamine (ADDERALL) 10 mg tablet Take 1 Tablet by mouth two (2) times a day for 30 days. Max Daily Amount: 20 mg. 60 Tablet 0    dextroamphetamine-amphetamine (ADDERALL) 10 mg tablet Take 1 Tablet by mouth two (2) times a day for 30 days. Max Daily Amount: 20 mg. 60 Tablet 0    dextroamphetamine-amphetamine (ADDERALL) 10 mg tablet Take 1 Tablet by mouth two (2) times a day for 30 days. Max Daily Amount: 20 mg. 60 Tablet 0    dextroamphetamine-amphetamine (ADDERALL) 10 mg tablet Take 1 Tablet by mouth two (2) times a day for 30 days. Max Daily Amount: 20 mg. 60 Tablet 0    dextroamphetamine-amphetamine (ADDERALL) 10 mg tablet Take 1 Tablet by mouth two (2) times daily as needed (for concentration/focus). Max Daily Amount: 20 mg.  Indications: attention deficit disorder with hyperactivity 60 Tablet 0    chlorphen-pseudoeph-ibuprofen (Advil Allergy Sinus) 2- mg tab Take  by mouth. No Known Allergies    ROS:   Review of Systems   Constitutional:  Negative for malaise/fatigue. Eyes:  Negative for blurred vision. Respiratory:  Negative for shortness of breath. Cardiovascular:  Negative for chest pain. Objective: There were no vitals taken for this visit. Vitals and Nurse Documentation reviewed. Physical Exam  Constitutional:       Appearance: Normal appearance. Neurological:      Mental Status: He is alert.    Psychiatric:         Attention and Perception: Attention normal.         Mood and Affect: Mood normal.         Speech: Speech normal.         Behavior: Behavior normal.         Cognition and Memory: Cognition normal.

## 2022-10-03 DIAGNOSIS — F98.8 ATTENTION DEFICIT DISORDER (ADD) WITHOUT HYPERACTIVITY: ICD-10-CM

## 2022-10-03 NOTE — TELEPHONE ENCOUNTER
Patient mychart request for refill. Check . Thanks, Apurva    Last Visit: VV 9/30/22 NP Aydin  Next Appointment: 12/19/22 Aydin  Previous Refill Encounter(s): 8/23/22 60    Requested Prescriptions     Pending Prescriptions Disp Refills    dextroamphetamine-amphetamine (ADDERALL) 10 mg tablet 60 Tablet 0     Sig: Take 1 Tablet by mouth two (2) times a day for 30 days. Max Daily Amount: 20 mg. For [facility] in place:   Recommendation Provided To:    Intervention Detail: New Rx: 1, reason: Patient Preference  Gap Closed?:   Intervention Accepted By:   Time Spent (min): 5

## 2022-10-05 RX ORDER — DEXTROAMPHETAMINE SACCHARATE, AMPHETAMINE ASPARTATE, DEXTROAMPHETAMINE SULFATE AND AMPHETAMINE SULFATE 2.5; 2.5; 2.5; 2.5 MG/1; MG/1; MG/1; MG/1
10 TABLET ORAL 2 TIMES DAILY
Qty: 60 TABLET | Refills: 0 | Status: SHIPPED | OUTPATIENT
Start: 2022-11-04 | End: 2022-12-04

## 2022-10-05 RX ORDER — DEXTROAMPHETAMINE SACCHARATE, AMPHETAMINE ASPARTATE, DEXTROAMPHETAMINE SULFATE AND AMPHETAMINE SULFATE 2.5; 2.5; 2.5; 2.5 MG/1; MG/1; MG/1; MG/1
10 TABLET ORAL 2 TIMES DAILY
Qty: 60 TABLET | Refills: 0 | Status: SHIPPED | OUTPATIENT
Start: 2022-12-04 | End: 2023-01-03

## 2022-10-05 RX ORDER — DEXTROAMPHETAMINE SACCHARATE, AMPHETAMINE ASPARTATE, DEXTROAMPHETAMINE SULFATE AND AMPHETAMINE SULFATE 2.5; 2.5; 2.5; 2.5 MG/1; MG/1; MG/1; MG/1
10 TABLET ORAL 2 TIMES DAILY
Qty: 60 TABLET | Refills: 0 | Status: SHIPPED | OUTPATIENT
Start: 2022-10-05 | End: 2022-11-04

## 2022-10-05 RX ORDER — DEXTROAMPHETAMINE SACCHARATE, AMPHETAMINE ASPARTATE, DEXTROAMPHETAMINE SULFATE AND AMPHETAMINE SULFATE 2.5; 2.5; 2.5; 2.5 MG/1; MG/1; MG/1; MG/1
10 TABLET ORAL 2 TIMES DAILY
Qty: 60 TABLET | Refills: 0 | OUTPATIENT
Start: 2022-10-05 | End: 2022-11-04

## 2022-12-09 DIAGNOSIS — Z51.81 MEDICATION MONITORING ENCOUNTER: ICD-10-CM

## 2022-12-09 DIAGNOSIS — Z00.00 ROUTINE GENERAL MEDICAL EXAMINATION AT A HEALTH CARE FACILITY: ICD-10-CM

## 2022-12-10 LAB
ALBUMIN SERPL-MCNC: 4.5 G/DL (ref 3.5–5)
ALBUMIN/GLOB SERPL: 1.4 {RATIO} (ref 1.1–2.2)
ALP SERPL-CCNC: 56 U/L (ref 45–117)
ALT SERPL-CCNC: 34 U/L (ref 12–78)
ANION GAP SERPL CALC-SCNC: 2 MMOL/L (ref 5–15)
AST SERPL-CCNC: 15 U/L (ref 15–37)
BASOPHILS # BLD: 0.1 K/UL (ref 0–0.1)
BASOPHILS NFR BLD: 1 % (ref 0–1)
BILIRUB SERPL-MCNC: 0.5 MG/DL (ref 0.2–1)
BUN SERPL-MCNC: 17 MG/DL (ref 6–20)
BUN/CREAT SERPL: 18 (ref 12–20)
CALCIUM SERPL-MCNC: 9 MG/DL (ref 8.5–10.1)
CHLORIDE SERPL-SCNC: 105 MMOL/L (ref 97–108)
CO2 SERPL-SCNC: 32 MMOL/L (ref 21–32)
CREAT SERPL-MCNC: 0.92 MG/DL (ref 0.7–1.3)
DIFFERENTIAL METHOD BLD: NORMAL
EOSINOPHIL # BLD: 0.2 K/UL (ref 0–0.4)
EOSINOPHIL NFR BLD: 2 % (ref 0–7)
ERYTHROCYTE [DISTWIDTH] IN BLOOD BY AUTOMATED COUNT: 11.9 % (ref 11.5–14.5)
GLOBULIN SER CALC-MCNC: 3.2 G/DL (ref 2–4)
GLUCOSE SERPL-MCNC: 108 MG/DL (ref 65–100)
HCT VFR BLD AUTO: 46 % (ref 36.6–50.3)
HGB BLD-MCNC: 15.4 G/DL (ref 12.1–17)
IMM GRANULOCYTES # BLD AUTO: 0 K/UL (ref 0–0.04)
IMM GRANULOCYTES NFR BLD AUTO: 0 % (ref 0–0.5)
LYMPHOCYTES # BLD: 1.8 K/UL (ref 0.8–3.5)
LYMPHOCYTES NFR BLD: 20 % (ref 12–49)
MCH RBC QN AUTO: 32.6 PG (ref 26–34)
MCHC RBC AUTO-ENTMCNC: 33.5 G/DL (ref 30–36.5)
MCV RBC AUTO: 97.5 FL (ref 80–99)
MONOCYTES # BLD: 0.7 K/UL (ref 0–1)
MONOCYTES NFR BLD: 8 % (ref 5–13)
NEUTS SEG # BLD: 6.4 K/UL (ref 1.8–8)
NEUTS SEG NFR BLD: 69 % (ref 32–75)
NRBC # BLD: 0 K/UL (ref 0–0.01)
NRBC BLD-RTO: 0 PER 100 WBC
PLATELET # BLD AUTO: 268 K/UL (ref 150–400)
PMV BLD AUTO: 10.2 FL (ref 8.9–12.9)
POTASSIUM SERPL-SCNC: 4.7 MMOL/L (ref 3.5–5.1)
PROT SERPL-MCNC: 7.7 G/DL (ref 6.4–8.2)
RBC # BLD AUTO: 4.72 M/UL (ref 4.1–5.7)
SODIUM SERPL-SCNC: 139 MMOL/L (ref 136–145)
WBC # BLD AUTO: 9.2 K/UL (ref 4.1–11.1)

## 2022-12-15 ENCOUNTER — TELEPHONE (OUTPATIENT)
Dept: FAMILY MEDICINE CLINIC | Age: 42
End: 2022-12-15

## 2022-12-16 ENCOUNTER — OFFICE VISIT (OUTPATIENT)
Dept: FAMILY MEDICINE CLINIC | Age: 42
End: 2022-12-16
Payer: COMMERCIAL

## 2022-12-16 VITALS
WEIGHT: 202.6 LBS | OXYGEN SATURATION: 100 % | TEMPERATURE: 98.3 F | SYSTOLIC BLOOD PRESSURE: 143 MMHG | HEIGHT: 72 IN | DIASTOLIC BLOOD PRESSURE: 84 MMHG | HEART RATE: 76 BPM | BODY MASS INDEX: 27.44 KG/M2 | RESPIRATION RATE: 16 BRPM

## 2022-12-16 DIAGNOSIS — F90.0 ADHD, PREDOMINANTLY INATTENTIVE TYPE: Primary | ICD-10-CM

## 2022-12-16 PROCEDURE — 99213 OFFICE O/P EST LOW 20 MIN: CPT | Performed by: NURSE PRACTITIONER

## 2022-12-16 NOTE — PROGRESS NOTES
Assessment/Plan:     Diagnoses and all orders for this visit:    1. ADHD, predominantly inattentive type  -     dextroamphetamine-amphetamine (ADDERALL) 10 mg tablet; Take 1 Tablet by mouth two (2) times a day for 30 days. Max Daily Amount: 20 mg.  -     dextroamphetamine-amphetamine (ADDERALL) 10 mg tablet; Take 1 Tablet by mouth two (2) times a day for 30 days. Max Daily Amount: 20 mg.  -     dextroamphetamine-amphetamine (ADDERALL) 10 mg tablet; Take 1 Tablet by mouth two (2) times a day for 30 days. Max Daily Amount: 20 mg.     Length of supply given today: 3 months  Last Urine Drug Screen: up to date  Change to Dosage: no, stable on current therapy   Reviewed: Yes      Blood pressure is elevated. Will recheck at next ov. Discussed expected course/resolution/complications of diagnosis in detail with patient. Medication risks/benefits/costs/interactions/alternatives discussed with patient. Pt was given after visit summary which includes diagnoses, current medications & vitals. Pt expressed understanding with the diagnosis and plan          Subjective:      Prerna Nicholson is a 38 y.o. male who presents for had concerns including Follow-up. ADHD   Prerna Nicholson is compliant with treatment regimen. Patient denies chest pain, shortness of breath, weight loss, insomnia, or difficulty with appetite. Symptoms are currently well controlled. Patient denies personal or family history of drug abuse. Previous neuropsychiatric evaluation is on file. Controlled substance contract is on file.           Patient Active Problem List   Diagnosis Code    ADD (attention deficit disorder) F98.8    Sleep disturbance G47.9    Chronic allergic rhinitis J30.9    Hypertriglyceridemia E78.1    FHx: colon cancer Z80.0    FHx: melanoma Z80.8       Current Outpatient Medications   Medication Sig Dispense Refill    dextroamphetamine-amphetamine (ADDERALL) 10 mg tablet Take 1 Tablet by mouth two (2) times a day for 30 days. Max Daily Amount: 20 mg. 60 Tablet 0    [START ON 1/22/2023] dextroamphetamine-amphetamine (ADDERALL) 10 mg tablet Take 1 Tablet by mouth two (2) times a day for 30 days. Max Daily Amount: 20 mg. 60 Tablet 0    [START ON 2/21/2023] dextroamphetamine-amphetamine (ADDERALL) 10 mg tablet Take 1 Tablet by mouth two (2) times a day for 30 days. Max Daily Amount: 20 mg. 60 Tablet 0    dextroamphetamine-amphetamine (ADDERALL) 10 mg tablet Take 1 Tablet by mouth two (2) times daily as needed (for concentration/focus). Max Daily Amount: 20 mg. Indications: attention deficit disorder with hyperactivity 60 Tablet 0    chlorphen-pseudoeph-ibuprofen (Advil Allergy Sinus) 2- mg tab Take  by mouth. dextroamphetamine-amphetamine (ADDERALL) 10 mg tablet Take 1 Tablet by mouth two (2) times a day for 30 days. Max Daily Amount: 20 mg. 60 Tablet 0    dextroamphetamine-amphetamine (ADDERALL) 10 mg tablet Take 1 Tablet by mouth two (2) times a day for 30 days. Max Daily Amount: 20 mg. 60 Tablet 0    dextroamphetamine-amphetamine (ADDERALL) 10 mg tablet Take 1 Tablet by mouth two (2) times a day for 30 days. Max Daily Amount: 20 mg. 60 Tablet 0    dextroamphetamine-amphetamine (ADDERALL) 10 mg tablet Take 1 Tablet by mouth two (2) times a day for 30 days. Max Daily Amount: 20 mg. 60 Tablet 0       No Known Allergies    ROS:   Review of Systems   Constitutional:  Negative for malaise/fatigue. Eyes:  Negative for blurred vision. Respiratory:  Negative for shortness of breath. Cardiovascular:  Negative for chest pain. Objective:   Visit Vitals  BP (!) 143/84 (BP 1 Location: Left upper arm, BP Patient Position: Sitting, BP Cuff Size: Large adult long)   Pulse 76   Temp 98.3 °F (36.8 °C)   Resp 16   Ht 6' (1.829 m)   Wt 202 lb 9.6 oz (91.9 kg)   SpO2 100%   BMI 27.48 kg/m²       Vitals and Nurse Documentation reviewed. Physical Exam  Constitutional:       General: He is not in acute distress.   Cardiovascular: Heart sounds: S1 normal and S2 normal. No murmur heard. No friction rub. No gallop. Pulmonary:      Effort: No respiratory distress. Breath sounds: Normal breath sounds. Skin:     General: Skin is warm and dry.    Psychiatric:         Mood and Affect: Mood and affect normal.

## 2022-12-16 NOTE — PROGRESS NOTES
Chief Complaint   Patient presents with    Follow-up       1. \"Have you been to the ER, urgent care clinic since your last visit? Hospitalized since your last visit? \" No    2. \"Have you seen or consulted any other health care providers outside of the [address] [place] [address] since your last visit? \" No     3. For patients over 45: Has the patient had a colonoscopy?  Yes - no Care Gap present       3 most recent PHQ Screens 12/13/2021   Little interest or pleasure in doing things Not at all   Feeling down, depressed, irritable, or hopeless Not at all   Total Score PHQ 2 0     Health Maintenance Due   Topic Date Due    DTaP/Tdap/Td series (1 - Tdap) 01/02/2012    Flu Vaccine (1) Never done    Depression Screen  12/13/2022

## 2022-12-18 LAB
AMPHETAMINE (GC/MS), 737687: 2710 NG/ML
AMPHETAMINE, 737686: POSITIVE
AMPHETAMINES UR QL SCN: NORMAL NG/ML
AMPHETAMINES, 737685: POSITIVE
BARBITURATES UR QL SCN: NEGATIVE NG/ML
BENZODIAZ UR QL SCN: NEGATIVE NG/ML
BZE UR QL SCN: NEGATIVE NG/ML
CANNABINOIDS UR QL SCN: NEGATIVE NG/ML
CREAT UR-MCNC: 134.7 MG/DL (ref 20–300)
METHADONE UR QL SCN: NEGATIVE NG/ML
METHAMPHETAMINE, 737688: NEGATIVE
OPIATES UR QL SCN: NEGATIVE NG/ML
OXYCODONE+OXYMORPHONE UR QL SCN: NEGATIVE NG/ML
PCP UR QL: NEGATIVE NG/ML
PH UR: 6.3 [PH] (ref 4.5–8.9)
PLEASE NOTE:, 733157: NORMAL
PROPOXYPH UR QL SCN: NEGATIVE NG/ML

## 2022-12-23 RX ORDER — DEXTROAMPHETAMINE SACCHARATE, AMPHETAMINE ASPARTATE, DEXTROAMPHETAMINE SULFATE AND AMPHETAMINE SULFATE 2.5; 2.5; 2.5; 2.5 MG/1; MG/1; MG/1; MG/1
10 TABLET ORAL 2 TIMES DAILY
Qty: 60 TABLET | Refills: 0 | Status: SHIPPED | OUTPATIENT
Start: 2023-02-21 | End: 2023-03-23

## 2022-12-23 RX ORDER — DEXTROAMPHETAMINE SACCHARATE, AMPHETAMINE ASPARTATE, DEXTROAMPHETAMINE SULFATE AND AMPHETAMINE SULFATE 2.5; 2.5; 2.5; 2.5 MG/1; MG/1; MG/1; MG/1
10 TABLET ORAL 2 TIMES DAILY
Qty: 60 TABLET | Refills: 0 | Status: SHIPPED | OUTPATIENT
Start: 2023-01-22 | End: 2023-02-21

## 2022-12-23 RX ORDER — DEXTROAMPHETAMINE SACCHARATE, AMPHETAMINE ASPARTATE, DEXTROAMPHETAMINE SULFATE AND AMPHETAMINE SULFATE 2.5; 2.5; 2.5; 2.5 MG/1; MG/1; MG/1; MG/1
10 TABLET ORAL 2 TIMES DAILY
Qty: 60 TABLET | Refills: 0 | Status: SHIPPED | OUTPATIENT
Start: 2022-12-23 | End: 2023-01-22

## 2023-03-28 ENCOUNTER — VIRTUAL VISIT (OUTPATIENT)
Dept: FAMILY MEDICINE CLINIC | Age: 43
End: 2023-03-28
Payer: COMMERCIAL

## 2023-03-28 DIAGNOSIS — F90.0 ADHD, PREDOMINANTLY INATTENTIVE TYPE: Primary | ICD-10-CM

## 2023-03-28 PROCEDURE — 99213 OFFICE O/P EST LOW 20 MIN: CPT | Performed by: NURSE PRACTITIONER

## 2023-03-28 RX ORDER — DEXTROAMPHETAMINE SACCHARATE, AMPHETAMINE ASPARTATE, DEXTROAMPHETAMINE SULFATE AND AMPHETAMINE SULFATE 2.5; 2.5; 2.5; 2.5 MG/1; MG/1; MG/1; MG/1
10 TABLET ORAL 2 TIMES DAILY
Qty: 60 TABLET | Refills: 0 | Status: SHIPPED | OUTPATIENT
Start: 2023-04-27 | End: 2023-05-27

## 2023-03-28 RX ORDER — DEXTROAMPHETAMINE SACCHARATE, AMPHETAMINE ASPARTATE, DEXTROAMPHETAMINE SULFATE AND AMPHETAMINE SULFATE 2.5; 2.5; 2.5; 2.5 MG/1; MG/1; MG/1; MG/1
10 TABLET ORAL 2 TIMES DAILY
Qty: 60 TABLET | Refills: 0 | Status: SHIPPED | OUTPATIENT
Start: 2023-03-28 | End: 2023-04-27

## 2023-03-28 RX ORDER — DEXTROAMPHETAMINE SACCHARATE, AMPHETAMINE ASPARTATE, DEXTROAMPHETAMINE SULFATE AND AMPHETAMINE SULFATE 2.5; 2.5; 2.5; 2.5 MG/1; MG/1; MG/1; MG/1
10 TABLET ORAL 2 TIMES DAILY
Qty: 60 TABLET | Refills: 0 | Status: SHIPPED | OUTPATIENT
Start: 2023-05-27 | End: 2023-06-26

## 2023-03-28 NOTE — PROGRESS NOTES
Assessment/Plan:     Diagnoses and all orders for this visit:    1. ADHD, predominantly inattentive type  -     dextroamphetamine-amphetamine (ADDERALL) 10 mg tablet; Take 1 Tablet by mouth two (2) times a day for 30 days. Max Daily Amount: 20 mg.  -     dextroamphetamine-amphetamine (ADDERALL) 10 mg tablet; Take 1 Tablet by mouth two (2) times a day for 30 days. Max Daily Amount: 20 mg.  -     dextroamphetamine-amphetamine (ADDERALL) 10 mg tablet; Take 1 Tablet by mouth two (2) times a day for 30 days. Max Daily Amount: 20 mg.     Length of supply given today: 3 months  Last Urine Drug Screen: up to date  Change to Dosage: no, stable on current therapy.  Reviewed: Yes      Follow-up and Dispositions    Return in about 3 months (around 6/28/2023) for Follow Up. The patient has consented for synchronous (real-time) Telemedicine (audio-video technology) on 3/28/23 for their care to be delivered over telemedicine in place of their regularly scheduled office visit pursuant to the emergency declaration under the [address] [place] [address], [address] [place] [address] authority and the [employer] and [employer] Act, this Virtual  Visit was conducted by the practitioner who is located in the medical office in [place], [place], with patient's consent, to reduce the patient's risk of exposure to COVID-19 and provide continuity of care. Visit Length: 20-29 minutes. Discussed expected course/resolution/complications of diagnosis in detail with patient. Medication risks/benefits/costs/interactions/alternatives discussed with patient. Pt was given after visit summary which includes diagnoses, current medications & vitals. Pt expressed understanding with the diagnosis and plan          Subjective:      Catalino Gibson is a 38 y.o. male who presents for had concerns including Medication Refill. ADHD   Catalino Gibson is compliant with treatment regimen. Patient denies chest pain, shortness of breath, weight loss, insomnia, or difficulty with appetite. Symptoms are currently well controlled. Patient denies personal or family history of drug abuse. Previous neuropsychiatric evaluation is on file. Controlled substance contract is on file. Patient Active Problem List   Diagnosis Code    ADD (attention deficit disorder) F98.8    Sleep disturbance G47.9    Chronic allergic rhinitis J30.9    Hypertriglyceridemia E78.1    FHx: colon cancer Z80.0    FHx: melanoma Z80.8       Current Outpatient Medications   Medication Sig Dispense Refill    dextroamphetamine-amphetamine (ADDERALL) 10 mg tablet Take 1 Tablet by mouth two (2) times a day for 30 days. Max Daily Amount: 20 mg. 60 Tablet 0    [START ON 4/27/2023] dextroamphetamine-amphetamine (ADDERALL) 10 mg tablet Take 1 Tablet by mouth two (2) times a day for 30 days. Max Daily Amount: 20 mg. 60 Tablet 0    [START ON 5/27/2023] dextroamphetamine-amphetamine (ADDERALL) 10 mg tablet Take 1 Tablet by mouth two (2) times a day for 30 days. Max Daily Amount: 20 mg. 60 Tablet 0    dextroamphetamine-amphetamine (ADDERALL) 10 mg tablet Take 1 Tablet by mouth two (2) times daily as needed (for concentration/focus). Max Daily Amount: 20 mg. Indications: attention deficit disorder with hyperactivity 60 Tablet 0       No Known Allergies    ROS:   Review of Systems   Constitutional:  Negative for malaise/fatigue. Eyes:  Negative for blurred vision. Respiratory:  Negative for shortness of breath. Cardiovascular:  Negative for chest pain. Objective: There were no vitals taken for this visit. Vitals and Nurse Documentation reviewed. Physical Exam  Constitutional:       Appearance: Normal appearance. Neurological:      Mental Status: He is alert.    Psychiatric:         Attention and Perception: Attention normal.         Mood and Affect: Mood normal.         Speech: Speech normal.         Behavior: Behavior normal. Cognition and Memory: Cognition normal.

## 2023-06-12 ENCOUNTER — TELEMEDICINE (OUTPATIENT)
Age: 43
End: 2023-06-12
Payer: COMMERCIAL

## 2023-06-12 DIAGNOSIS — F90.0 ATTENTION-DEFICIT HYPERACTIVITY DISORDER, PREDOMINANTLY INATTENTIVE TYPE: Primary | ICD-10-CM

## 2023-06-12 PROCEDURE — 99213 OFFICE O/P EST LOW 20 MIN: CPT | Performed by: NURSE PRACTITIONER

## 2023-06-12 ASSESSMENT — ENCOUNTER SYMPTOMS: SHORTNESS OF BREATH: 0

## 2023-06-19 RX ORDER — DEXTROAMPHETAMINE SACCHARATE, AMPHETAMINE ASPARTATE, DEXTROAMPHETAMINE SULFATE AND AMPHETAMINE SULFATE 2.5; 2.5; 2.5; 2.5 MG/1; MG/1; MG/1; MG/1
10 TABLET ORAL 2 TIMES DAILY
Qty: 60 TABLET | Refills: 0 | Status: SHIPPED | OUTPATIENT
Start: 2023-07-19 | End: 2023-08-18

## 2023-06-19 RX ORDER — DEXTROAMPHETAMINE SACCHARATE, AMPHETAMINE ASPARTATE, DEXTROAMPHETAMINE SULFATE AND AMPHETAMINE SULFATE 2.5; 2.5; 2.5; 2.5 MG/1; MG/1; MG/1; MG/1
10 TABLET ORAL 2 TIMES DAILY
Qty: 60 TABLET | Refills: 0 | Status: SHIPPED | OUTPATIENT
Start: 2023-06-19 | End: 2023-07-19

## 2023-06-19 RX ORDER — DEXTROAMPHETAMINE SACCHARATE, AMPHETAMINE ASPARTATE, DEXTROAMPHETAMINE SULFATE AND AMPHETAMINE SULFATE 2.5; 2.5; 2.5; 2.5 MG/1; MG/1; MG/1; MG/1
10 TABLET ORAL 2 TIMES DAILY
Qty: 60 TABLET | Refills: 0 | Status: SHIPPED | OUTPATIENT
Start: 2023-08-18 | End: 2023-09-17

## 2023-10-04 ENCOUNTER — TELEMEDICINE (OUTPATIENT)
Age: 43
End: 2023-10-04
Payer: COMMERCIAL

## 2023-10-04 DIAGNOSIS — F90.0 ADHD, PREDOMINANTLY INATTENTIVE TYPE: Primary | ICD-10-CM

## 2023-10-04 PROCEDURE — 99213 OFFICE O/P EST LOW 20 MIN: CPT | Performed by: NURSE PRACTITIONER

## 2023-10-04 ASSESSMENT — ENCOUNTER SYMPTOMS: SHORTNESS OF BREATH: 0

## 2023-10-04 NOTE — PROGRESS NOTES
Assessment/Plan:     1. ADHD, predominantly inattentive type  -     amphetamine-dextroamphetamine (ADDERALL) 10 MG tablet; Take 1 tablet by mouth 2 times daily for 30 days. Max Daily Amount: 20 mg, Disp-60 tablet, R-0Normal  -     amphetamine-dextroamphetamine (ADDERALL) 10 MG tablet; Take 1 tablet by mouth 2 times daily for 30 days. Max Daily Amount: 20 mg, Disp-60 tablet, R-0Normal  -     amphetamine-dextroamphetamine (ADDERALL) 10 MG tablet; Take 1 tablet by mouth 2 times daily for 30 days. Max Daily Amount: 20 mg, Disp-60 tablet, R-0Normal   Length of supply given today: 3 months  Last Urine Drug Screen: up to date  Change to Dosage:no stable on current therapy   Reviewed: Yes      Return in about 3 months (around 1/4/2024) for Follow Up. Tanika Wheat, was evaluated through a synchronous (real-time) audio-video encounter. The patient (or guardian if applicable) is aware that this is a billable service, which includes applicable co-pays. This Virtual Visit was conducted with patient's (and/or legal guardian's) consent. Patient identification was verified, and a caregiver was present when appropriate. The patient was located at Home: [address] [place] [address]  Provider was located at [facility] (Appt Dept): [address] [facility],  [address] [place]       Total time spent for this encounter:  20-29 minutes. --MIAH De La Vega NP on 10/04/2023 at 9:23 PM    An electronic signature was used to authenticate this note. Discussed expected course/resolution/complications of diagnosis in detail with patient. Medication risks/benefits/costs/interactions/alternatives discussed with patient. Pt was given after visit summary which includes diagnoses, current medications & vitals. Pt expressed understanding with the diagnosis and plan          Subjective:      Tanika Wheat is a 37 y.o. male who presents for had concerns including Medication Refill.      ADHD   Paola Stager

## 2023-10-10 RX ORDER — DEXTROAMPHETAMINE SACCHARATE, AMPHETAMINE ASPARTATE, DEXTROAMPHETAMINE SULFATE AND AMPHETAMINE SULFATE 2.5; 2.5; 2.5; 2.5 MG/1; MG/1; MG/1; MG/1
10 TABLET ORAL 2 TIMES DAILY
Qty: 60 TABLET | Refills: 0 | Status: SHIPPED | OUTPATIENT
Start: 2023-10-10 | End: 2023-11-09

## 2023-10-10 RX ORDER — DEXTROAMPHETAMINE SACCHARATE, AMPHETAMINE ASPARTATE, DEXTROAMPHETAMINE SULFATE AND AMPHETAMINE SULFATE 2.5; 2.5; 2.5; 2.5 MG/1; MG/1; MG/1; MG/1
10 TABLET ORAL 2 TIMES DAILY
Qty: 60 TABLET | Refills: 0 | Status: SHIPPED | OUTPATIENT
Start: 2023-12-09 | End: 2024-01-08

## 2023-10-10 RX ORDER — DEXTROAMPHETAMINE SACCHARATE, AMPHETAMINE ASPARTATE, DEXTROAMPHETAMINE SULFATE AND AMPHETAMINE SULFATE 2.5; 2.5; 2.5; 2.5 MG/1; MG/1; MG/1; MG/1
10 TABLET ORAL 2 TIMES DAILY
Qty: 60 TABLET | Refills: 0 | Status: SHIPPED | OUTPATIENT
Start: 2023-11-09 | End: 2023-12-09

## 2024-02-06 NOTE — TELEPHONE ENCOUNTER
Pt's wife Holland Damon called  wants to know if the urinalysis result was in. Caller said she needs a call back to figure out if she needs to cx 12/16/22 appt for med refill. Since Pt cannot get refill without UA result.      BCB# [phone] no

## 2024-02-09 ENCOUNTER — NURSE ONLY (OUTPATIENT)
Age: 44
End: 2024-02-09

## 2024-02-09 ENCOUNTER — PATIENT MESSAGE (OUTPATIENT)
Age: 44
End: 2024-02-09

## 2024-02-09 DIAGNOSIS — F90.0 ATTENTION-DEFICIT HYPERACTIVITY DISORDER, PREDOMINANTLY INATTENTIVE TYPE: ICD-10-CM

## 2024-02-09 DIAGNOSIS — Z00.00 ROUTINE GENERAL MEDICAL EXAMINATION AT A HEALTH CARE FACILITY: ICD-10-CM

## 2024-02-09 DIAGNOSIS — F90.0 ATTENTION DEFICIT HYPERACTIVITY DISORDER (ADHD), PREDOMINANTLY INATTENTIVE TYPE: Primary | ICD-10-CM

## 2024-02-09 DIAGNOSIS — Z12.5 ENCOUNTER FOR PROSTATE CANCER SCREENING: ICD-10-CM

## 2024-02-09 DIAGNOSIS — Z13.1 SCREENING FOR DIABETES MELLITUS: ICD-10-CM

## 2024-02-09 DIAGNOSIS — Z00.00 ROUTINE GENERAL MEDICAL EXAMINATION AT A HEALTH CARE FACILITY: Primary | ICD-10-CM

## 2024-02-09 DIAGNOSIS — Z13.220 SCREENING, LIPID: ICD-10-CM

## 2024-02-09 LAB
ALBUMIN SERPL-MCNC: 4.2 G/DL (ref 3.5–5)
ALBUMIN/GLOB SERPL: 1.2 (ref 1.1–2.2)
ALP SERPL-CCNC: 50 U/L (ref 45–117)
ALT SERPL-CCNC: 32 U/L (ref 12–78)
ANION GAP SERPL CALC-SCNC: 5 MMOL/L (ref 5–15)
AST SERPL-CCNC: 17 U/L (ref 15–37)
BASOPHILS # BLD: 0.1 K/UL (ref 0–0.1)
BASOPHILS NFR BLD: 1 % (ref 0–1)
BILIRUB SERPL-MCNC: 0.5 MG/DL (ref 0.2–1)
BUN SERPL-MCNC: 15 MG/DL (ref 6–20)
BUN/CREAT SERPL: 15 (ref 12–20)
CALCIUM SERPL-MCNC: 9 MG/DL (ref 8.5–10.1)
CHLORIDE SERPL-SCNC: 104 MMOL/L (ref 97–108)
CHOLEST SERPL-MCNC: 230 MG/DL
CO2 SERPL-SCNC: 30 MMOL/L (ref 21–32)
CREAT SERPL-MCNC: 1.01 MG/DL (ref 0.7–1.3)
DIFFERENTIAL METHOD BLD: NORMAL
EOSINOPHIL # BLD: 0.2 K/UL (ref 0–0.4)
EOSINOPHIL NFR BLD: 4 % (ref 0–7)
ERYTHROCYTE [DISTWIDTH] IN BLOOD BY AUTOMATED COUNT: 11.7 % (ref 11.5–14.5)
GLOBULIN SER CALC-MCNC: 3.4 G/DL (ref 2–4)
GLUCOSE SERPL-MCNC: 102 MG/DL (ref 65–100)
HCT VFR BLD AUTO: 44.4 % (ref 36.6–50.3)
HDLC SERPL-MCNC: 44 MG/DL
HDLC SERPL: 5.2 (ref 0–5)
HGB BLD-MCNC: 14.7 G/DL (ref 12.1–17)
IMM GRANULOCYTES # BLD AUTO: 0 K/UL (ref 0–0.04)
IMM GRANULOCYTES NFR BLD AUTO: 0 % (ref 0–0.5)
LDLC SERPL CALC-MCNC: 149.8 MG/DL (ref 0–100)
LYMPHOCYTES # BLD: 1.6 K/UL (ref 0.8–3.5)
LYMPHOCYTES NFR BLD: 30 % (ref 12–49)
MCH RBC QN AUTO: 32 PG (ref 26–34)
MCHC RBC AUTO-ENTMCNC: 33.1 G/DL (ref 30–36.5)
MCV RBC AUTO: 96.7 FL (ref 80–99)
MONOCYTES # BLD: 0.5 K/UL (ref 0–1)
MONOCYTES NFR BLD: 10 % (ref 5–13)
NEUTS SEG # BLD: 3 K/UL (ref 1.8–8)
NEUTS SEG NFR BLD: 55 % (ref 32–75)
NRBC # BLD: 0 K/UL (ref 0–0.01)
NRBC BLD-RTO: 0 PER 100 WBC
PLATELET # BLD AUTO: 236 K/UL (ref 150–400)
PMV BLD AUTO: 10.1 FL (ref 8.9–12.9)
POTASSIUM SERPL-SCNC: 3.7 MMOL/L (ref 3.5–5.1)
PROT SERPL-MCNC: 7.6 G/DL (ref 6.4–8.2)
PSA SERPL-MCNC: 0.4 NG/ML (ref 0.01–4)
RBC # BLD AUTO: 4.59 M/UL (ref 4.1–5.7)
SODIUM SERPL-SCNC: 139 MMOL/L (ref 136–145)
TRIGL SERPL-MCNC: 181 MG/DL
VLDLC SERPL CALC-MCNC: 36.2 MG/DL
WBC # BLD AUTO: 5.4 K/UL (ref 4.1–11.1)

## 2024-02-10 LAB
AMPHETAMINES UR QL SCN: NEGATIVE NG/ML
BARBITURATES UR QL SCN: NEGATIVE NG/ML
BZE UR QL SCN: NEGATIVE NG/ML
CANNABINOIDS UR QL SCN: NEGATIVE NG/ML
CREAT UR-MCNC: 52.5 MG/DL (ref 20–300)
EST. AVERAGE GLUCOSE BLD GHB EST-MCNC: 117 MG/DL
HBA1C MFR BLD: 5.7 % (ref 4–5.6)
LABORATORY COMMENT REPORT: NORMAL
METHADONE UR QL SCN: NEGATIVE NG/ML
OPIATES UR QL SCN: NEGATIVE NG/ML
OXYCODONE+OXYMORPHONE UR QL SCN: NEGATIVE NG/ML
PCP UR QL: NEGATIVE NG/ML
PH UR: 8 (ref 4.5–8.9)
PROPOXYPH UR QL SCN: NEGATIVE NG/ML
SP GR UR: 1.01

## 2024-02-13 RX ORDER — DEXTROAMPHETAMINE SACCHARATE, AMPHETAMINE ASPARTATE, DEXTROAMPHETAMINE SULFATE AND AMPHETAMINE SULFATE 2.5; 2.5; 2.5; 2.5 MG/1; MG/1; MG/1; MG/1
10 TABLET ORAL 2 TIMES DAILY
Qty: 60 TABLET | Refills: 0 | Status: SHIPPED | OUTPATIENT
Start: 2024-02-13 | End: 2024-03-14

## 2024-02-13 NOTE — TELEPHONE ENCOUNTER
From: Flako Carbone  To: Emma BIANCHI Celenamike  Sent: 2/9/2024 10:47 AM EST  Subject: 1 month Refill    Hi Emma,    Apologies for running late this morning, was stuck on a conference call that was supposed to wrap up earlier. I tried calling on the way to the office to let you know I was rushing to get to my appointment, but unfortunately could not get through to anyone. I took care of my labs while I was in the office and rescheduled my appointment, first available was March 14, 2024. Would you be able to send in a one month prescription for Adderall until my March appointment?    Thanks so much,  Parveen Carbone

## 2024-02-14 LAB
ALPRAZ UR QL: NEGATIVE
AMPHETAMINES UR QL SCN: NEGATIVE NG/ML
BARBITURATES UR QL SCN: NEGATIVE NG/ML
BENZODIAZ UR QL: NEGATIVE NG/ML
BZE UR QL SCN: NEGATIVE NG/ML
CANNABINOIDS UR QL SCN: NEGATIVE NG/ML
CLONAZEPAM UR QL: NEGATIVE
CREAT UR-MCNC: 52.5 MG/DL (ref 20–300)
FLURAZEPAM UR QL: NEGATIVE
LABORATORY COMMENT REPORT: NORMAL
LORAZEPAM UR QL: NEGATIVE
METHADONE UR QL SCN: NEGATIVE NG/ML
MIDAZOLAM UR QL CFM: NEGATIVE
OPIATES UR QL SCN: NEGATIVE NG/ML
OXAZEPAM UR QL: NEGATIVE
OXYCODONE+OXYMORPHONE UR QL SCN: NEGATIVE NG/ML
PCP UR QL: NEGATIVE NG/ML
PH UR: 8 (ref 4.5–8.9)
PROPOXYPH UR QL SCN: NEGATIVE NG/ML
SP GR UR: 1.01
TEMAZEPAM UR QL CFM: NEGATIVE
TRIAZOLAM UR QL: NEGATIVE

## 2024-03-14 ENCOUNTER — OFFICE VISIT (OUTPATIENT)
Age: 44
End: 2024-03-14
Payer: COMMERCIAL

## 2024-03-14 VITALS
TEMPERATURE: 97.7 F | HEIGHT: 72 IN | BODY MASS INDEX: 27.41 KG/M2 | WEIGHT: 202.4 LBS | OXYGEN SATURATION: 97 % | DIASTOLIC BLOOD PRESSURE: 81 MMHG | SYSTOLIC BLOOD PRESSURE: 123 MMHG | HEART RATE: 87 BPM

## 2024-03-14 DIAGNOSIS — F90.0 ADHD, PREDOMINANTLY INATTENTIVE TYPE: ICD-10-CM

## 2024-03-14 DIAGNOSIS — Z00.00 ROUTINE GENERAL MEDICAL EXAMINATION AT A HEALTH CARE FACILITY: Primary | ICD-10-CM

## 2024-03-14 PROCEDURE — 99396 PREV VISIT EST AGE 40-64: CPT | Performed by: NURSE PRACTITIONER

## 2024-03-14 ASSESSMENT — PATIENT HEALTH QUESTIONNAIRE - PHQ9
SUM OF ALL RESPONSES TO PHQ QUESTIONS 1-9: 0
10. IF YOU CHECKED OFF ANY PROBLEMS, HOW DIFFICULT HAVE THESE PROBLEMS MADE IT FOR YOU TO DO YOUR WORK, TAKE CARE OF THINGS AT HOME, OR GET ALONG WITH OTHER PEOPLE: NOT DIFFICULT AT ALL
SUM OF ALL RESPONSES TO PHQ QUESTIONS 1-9: 0
4. FEELING TIRED OR HAVING LITTLE ENERGY: NOT AT ALL
5. POOR APPETITE OR OVEREATING: NOT AT ALL
8. MOVING OR SPEAKING SO SLOWLY THAT OTHER PEOPLE COULD HAVE NOTICED. OR THE OPPOSITE, BEING SO FIGETY OR RESTLESS THAT YOU HAVE BEEN MOVING AROUND A LOT MORE THAN USUAL: NOT AT ALL
SUM OF ALL RESPONSES TO PHQ9 QUESTIONS 1 & 2: 0
SUM OF ALL RESPONSES TO PHQ QUESTIONS 1-9: 0
6. FEELING BAD ABOUT YOURSELF - OR THAT YOU ARE A FAILURE OR HAVE LET YOURSELF OR YOUR FAMILY DOWN: NOT AT ALL
7. TROUBLE CONCENTRATING ON THINGS, SUCH AS READING THE NEWSPAPER OR WATCHING TELEVISION: NOT AT ALL
3. TROUBLE FALLING OR STAYING ASLEEP: NOT AT ALL
9. THOUGHTS THAT YOU WOULD BE BETTER OFF DEAD, OR OF HURTING YOURSELF: NOT AT ALL
SUM OF ALL RESPONSES TO PHQ QUESTIONS 1-9: 0
1. LITTLE INTEREST OR PLEASURE IN DOING THINGS: NOT AT ALL
2. FEELING DOWN, DEPRESSED OR HOPELESS: NOT AT ALL

## 2024-03-14 NOTE — PROGRESS NOTES
Chief Complaint   Patient presents with    Annual Exam     \"Have you been to the ER, urgent care clinic since your last visit?  Hospitalized since your last visit?\"    NO    “Have you seen or consulted any other health care providers outside of [facility] since your last visit?”    NO                   3/14/2024    10:31 AM   PHQ-9    Little interest or pleasure in doing things 0   Feeling down, depressed, or hopeless 0   Trouble falling or staying asleep, or sleeping too much 0   Feeling tired or having little energy 0   Poor appetite or overeating 0   Feeling bad about yourself - or that you are a failure or have let yourself or your family down 0   Trouble concentrating on things, such as reading the newspaper or watching television 0   Moving or speaking so slowly that other people could have noticed. Or the opposite - being so fidgety or restless that you have been moving around a lot more than usual 0   Thoughts that you would be better off dead, or of hurting yourself in some way 0   PHQ-2 Score 0   PHQ-9 Total Score 0   If you checked off any problems, how difficult have these problems made it for you to do your work, take care of things at home, or get along with other people? 0           Financial Resource Strain: Low Risk  (12/16/2022)    Overall Financial Resource Strain (CARDIA)     Difficulty of Paying Living Expenses: Not hard at all      Food Insecurity: Not on file (2/8/2024)          Health Maintenance Due   Topic Date Due    Hepatitis B vaccine (1 of 3 - 3-dose series) Never done    DTaP/Tdap/Td vaccine (1 - Tdap) 01/02/2012    COVID-19 Vaccine (3 - 2023-24 season) 09/01/2023    Depression Screen  12/16/2023

## 2024-03-14 NOTE — PROGRESS NOTES
Assessment/ Plan:   Full age appropriate History and Physical exam as well as health care maintenance  performed and discussed today.  Risk factor modification discussed today includes safe sex practices, healthy diet and exercise, and seat belt use. Continue current medications.    1. Routine general medical examination at a health care facility  2. ADHD, predominantly inattentive type  -     amphetamine-dextroamphetamine (ADDERALL) 10 MG tablet; Take 1 tablet by mouth 2 times daily for 30 days. Max Daily Amount: 20 mg, Disp-60 tablet, R-0Normal  -     amphetamine-dextroamphetamine (ADDERALL) 10 MG tablet; Take 1 tablet by mouth 2 times daily for 30 days. Max Daily Amount: 20 mg, Disp-60 tablet, R-0Normal  -     amphetamine-dextroamphetamine (ADDERALL) 10 MG tablet; Take 1 tablet by mouth 2 times daily for 30 days. Max Daily Amount: 20 mg, Disp-60 tablet, R-0Normal         Labs reviewed in office today.     Length of supply given today: 3 months  Last Urine Drug Screen: up to date  Change to Dosage: stable on current therapy   Reviewed: Yes      Return in 3 months (on 6/14/2024).     Discussed expected course/resolution/complications of diagnosis in detail with patient.    Medication risks/benefits/costs/interactions/alternatives discussed with patient.    Pt was given after visit summary which includes diagnoses, current medications & vitals.   Pt expressed understanding with the diagnosis and plan      HPI:   Flako Carbone is a 43 y.o. male who presents for annual exam.    Cardiovascular Review:  The patient has hyperlipidemia and dysmetabolic syndrome X.  Diet and Lifestyle: generally follows a low fat low cholesterol diet, exercises regularly  Home BP Monitoring: is not measured at home.  Pertinent ROS: no TIA's, no chest pain on exertion, no dyspnea on exertion, no swelling of ankles.       ADHD   Flako Carbone is compliant with treatment regimen.  Patient denies chest pain, shortness of breath,

## 2024-03-22 RX ORDER — DEXTROAMPHETAMINE SACCHARATE, AMPHETAMINE ASPARTATE, DEXTROAMPHETAMINE SULFATE AND AMPHETAMINE SULFATE 2.5; 2.5; 2.5; 2.5 MG/1; MG/1; MG/1; MG/1
10 TABLET ORAL 2 TIMES DAILY
Qty: 60 TABLET | Refills: 0 | Status: SHIPPED | OUTPATIENT
Start: 2024-04-21 | End: 2024-05-21

## 2024-03-22 RX ORDER — DEXTROAMPHETAMINE SACCHARATE, AMPHETAMINE ASPARTATE, DEXTROAMPHETAMINE SULFATE AND AMPHETAMINE SULFATE 2.5; 2.5; 2.5; 2.5 MG/1; MG/1; MG/1; MG/1
10 TABLET ORAL 2 TIMES DAILY
Qty: 60 TABLET | Refills: 0 | Status: SHIPPED | OUTPATIENT
Start: 2024-03-22 | End: 2024-04-21

## 2024-03-22 RX ORDER — DEXTROAMPHETAMINE SACCHARATE, AMPHETAMINE ASPARTATE, DEXTROAMPHETAMINE SULFATE AND AMPHETAMINE SULFATE 2.5; 2.5; 2.5; 2.5 MG/1; MG/1; MG/1; MG/1
10 TABLET ORAL 2 TIMES DAILY
Qty: 60 TABLET | Refills: 0 | Status: SHIPPED | OUTPATIENT
Start: 2024-05-21 | End: 2024-06-20

## 2024-09-03 DIAGNOSIS — F90.0 ADHD, PREDOMINANTLY INATTENTIVE TYPE: ICD-10-CM

## 2024-09-03 RX ORDER — DEXTROAMPHETAMINE SACCHARATE, AMPHETAMINE ASPARTATE, DEXTROAMPHETAMINE SULFATE AND AMPHETAMINE SULFATE 2.5; 2.5; 2.5; 2.5 MG/1; MG/1; MG/1; MG/1
10 TABLET ORAL 2 TIMES DAILY
Qty: 60 TABLET | Refills: 0 | OUTPATIENT
Start: 2024-09-03 | End: 2024-10-03

## 2024-09-03 NOTE — TELEPHONE ENCOUNTER
PCP: Emma Murillo APRN - NP    Last appt: 3/14/2024   Future Appointments   Date Time Provider Department Center   9/27/2024 11:45 AM mEma Murillo APRN - NP [facility] DE[facility]       Requested Prescriptions     Pending Prescriptions Disp Refills    amphetamine-dextroamphetamine (ADDERALL) 10 MG tablet 60 tablet 0     Sig: Take 1 tablet by mouth 2 times daily for 30 days. Max Daily Amount: 20 mg         Prior labs and Blood pressures:  BP Readings from Last 3 Encounters:   03/14/24 123/81   12/16/22 (!) 143/84   12/13/21 126/83     Lab Results   Component Value Date/Time     02/09/2024 10:17 AM    K 3.7 02/09/2024 10:17 AM     02/09/2024 10:17 AM    CO2 30 02/09/2024 10:17 AM    BUN 15 02/09/2024 10:17 AM    GFRAA 97 11/27/2021 10:11 AM     No results found for: \"HBA1C\", \"YST2FAKD\"  Lab Results   Component Value Date/Time    CHOL 230 02/09/2024 10:17 AM    HDL 44 02/09/2024 10:17 AM    .8 02/09/2024 10:17 AM    VLDL 36.2 02/09/2024 10:17 AM    VLDL 24 11/27/2021 10:11 AM     No results found for: \"VITD3\"    No results found for: \"TSH\", \"TSH2\", \"TSH3\"

## 2024-09-20 ENCOUNTER — TELEMEDICINE (OUTPATIENT)
Age: 44
End: 2024-09-20
Payer: COMMERCIAL

## 2024-09-20 DIAGNOSIS — F90.2 ADHD (ATTENTION DEFICIT HYPERACTIVITY DISORDER), COMBINED TYPE: Primary | ICD-10-CM

## 2024-09-20 PROCEDURE — 99213 OFFICE O/P EST LOW 20 MIN: CPT | Performed by: NURSE PRACTITIONER

## 2024-09-20 RX ORDER — DEXTROAMPHETAMINE SACCHARATE, AMPHETAMINE ASPARTATE, DEXTROAMPHETAMINE SULFATE AND AMPHETAMINE SULFATE 2.5; 2.5; 2.5; 2.5 MG/1; MG/1; MG/1; MG/1
10 TABLET ORAL 2 TIMES DAILY
Qty: 60 TABLET | Refills: 0 | Status: SHIPPED | OUTPATIENT
Start: 2024-11-19 | End: 2024-12-19

## 2024-09-20 RX ORDER — DEXTROAMPHETAMINE SACCHARATE, AMPHETAMINE ASPARTATE, DEXTROAMPHETAMINE SULFATE AND AMPHETAMINE SULFATE 2.5; 2.5; 2.5; 2.5 MG/1; MG/1; MG/1; MG/1
10 TABLET ORAL 2 TIMES DAILY
Qty: 60 TABLET | Refills: 0 | Status: SHIPPED | OUTPATIENT
Start: 2024-09-20 | End: 2024-10-20

## 2024-09-20 RX ORDER — DEXTROAMPHETAMINE SACCHARATE, AMPHETAMINE ASPARTATE, DEXTROAMPHETAMINE SULFATE AND AMPHETAMINE SULFATE 2.5; 2.5; 2.5; 2.5 MG/1; MG/1; MG/1; MG/1
10 TABLET ORAL 2 TIMES DAILY
Qty: 60 TABLET | Refills: 0 | Status: SHIPPED | OUTPATIENT
Start: 2024-10-20 | End: 2024-11-19

## 2024-12-20 ENCOUNTER — TELEMEDICINE (OUTPATIENT)
Age: 44
End: 2024-12-20
Payer: COMMERCIAL

## 2024-12-20 DIAGNOSIS — F90.2 ADHD (ATTENTION DEFICIT HYPERACTIVITY DISORDER), COMBINED TYPE: Primary | ICD-10-CM

## 2024-12-20 PROCEDURE — 99213 OFFICE O/P EST LOW 20 MIN: CPT | Performed by: NURSE PRACTITIONER

## 2024-12-20 RX ORDER — DEXTROAMPHETAMINE SACCHARATE, AMPHETAMINE ASPARTATE, DEXTROAMPHETAMINE SULFATE AND AMPHETAMINE SULFATE 2.5; 2.5; 2.5; 2.5 MG/1; MG/1; MG/1; MG/1
10 TABLET ORAL 2 TIMES DAILY
Qty: 60 TABLET | Refills: 0 | Status: SHIPPED | OUTPATIENT
Start: 2025-01-19 | End: 2025-02-18

## 2024-12-20 RX ORDER — DEXTROAMPHETAMINE SACCHARATE, AMPHETAMINE ASPARTATE, DEXTROAMPHETAMINE SULFATE AND AMPHETAMINE SULFATE 2.5; 2.5; 2.5; 2.5 MG/1; MG/1; MG/1; MG/1
10 TABLET ORAL 2 TIMES DAILY
Qty: 60 TABLET | Refills: 0 | Status: SHIPPED | OUTPATIENT
Start: 2025-02-18 | End: 2025-03-20

## 2024-12-20 RX ORDER — DEXTROAMPHETAMINE SACCHARATE, AMPHETAMINE ASPARTATE, DEXTROAMPHETAMINE SULFATE AND AMPHETAMINE SULFATE 2.5; 2.5; 2.5; 2.5 MG/1; MG/1; MG/1; MG/1
10 TABLET ORAL 2 TIMES DAILY
Qty: 60 TABLET | Refills: 0 | Status: SHIPPED | OUTPATIENT
Start: 2024-12-20 | End: 2025-01-19

## 2024-12-20 ASSESSMENT — ENCOUNTER SYMPTOMS: SHORTNESS OF BREATH: 0

## 2024-12-20 NOTE — PROGRESS NOTES
Assessment/Plan:     1. ADHD (attention deficit hyperactivity disorder), combined type  -     amphetamine-dextroamphetamine (ADDERALL) 10 MG tablet; Take 1 tablet by mouth 2 times daily for 30 days. Max Daily Amount: 20 mg, Disp-60 tablet, R-0Normal  -     amphetamine-dextroamphetamine (ADDERALL) 10 MG tablet; Take 1 tablet by mouth 2 times daily for 30 days. Max Daily Amount: 20 mg, Disp-60 tablet, R-0Normal  -     amphetamine-dextroamphetamine (ADDERALL) 10 MG tablet; Take 1 tablet by mouth 2 times daily for 30 days. Max Daily Amount: 20 mg, Disp-60 tablet, R-0Normal   Length of supply given today: 3 months  Last Urine Drug Screen: Up-to-date  Change to Dosage: Stable on current therapy   Reviewed: Yes    Patient was evaluated through a synchronous (real-time) audio-video encounter. The patient (and/or guardian if applicable) is aware that this is a billable service, which includes applicable co-pays. This virtual visit was conducted with patient's (and/or legal guardian's) consent. Patient identification was verified, and a caregiver was present when appropriate.  The patient was located at Home: [address] [place] [id number]  The provider was located at Facility (Login Dept): [facility]  [address] [facility] [address] [phone]     Total time spent on this encounter: Not billed by time    --MIAH Christensen NP     An electronic signature was used to authenticate this note.    Return in about 3 months (around 3/20/2025) for Annual Physical Exam.     Discussed expected course/resolution/complications of diagnosis in detail with patient.    Medication risks/benefits/costs/interactions/alternatives discussed with patient.    Pt was given after visit summary which includes diagnoses, current medications & vitals.   Pt expressed understanding with the diagnosis and plan          Subjective:      Flako Carbone is a 44 y.o. male who presents for had concerns

## 2025-03-21 ENCOUNTER — TELEPHONE (OUTPATIENT)
Age: 45
End: 2025-03-21

## 2025-03-21 ENCOUNTER — PATIENT MESSAGE (OUTPATIENT)
Age: 45
End: 2025-03-21

## 2025-03-21 DIAGNOSIS — F90.0 ATTENTION-DEFICIT HYPERACTIVITY DISORDER, PREDOMINANTLY INATTENTIVE TYPE: ICD-10-CM

## 2025-03-21 DIAGNOSIS — Z00.00 ROUTINE GENERAL MEDICAL EXAMINATION AT A HEALTH CARE FACILITY: ICD-10-CM

## 2025-03-21 DIAGNOSIS — F90.2 ATTENTION DEFICIT HYPERACTIVITY DISORDER (ADHD), COMBINED TYPE: Primary | ICD-10-CM

## 2025-03-21 DIAGNOSIS — Z13.1 SCREENING FOR DIABETES MELLITUS: ICD-10-CM

## 2025-03-21 DIAGNOSIS — Z12.5 ENCOUNTER FOR PROSTATE CANCER SCREENING: ICD-10-CM

## 2025-03-21 DIAGNOSIS — Z13.220 SCREENING, LIPID: ICD-10-CM

## 2025-03-21 NOTE — TELEPHONE ENCOUNTER
Pt is requesting lab orders. Pt would like if possible for orders to be [facility] which would be quickest route for pt. When lab orders have been submitted. Pt would like a phone call to know the orders have been put in        Pt can be reached at [phone]